# Patient Record
Sex: FEMALE | Race: BLACK OR AFRICAN AMERICAN | Employment: FULL TIME | URBAN - METROPOLITAN AREA
[De-identification: names, ages, dates, MRNs, and addresses within clinical notes are randomized per-mention and may not be internally consistent; named-entity substitution may affect disease eponyms.]

---

## 2017-03-08 ENCOUNTER — HOSPITAL ENCOUNTER (EMERGENCY)
Age: 27
Discharge: HOME | End: 2017-03-08
Attending: EMERGENCY MEDICINE
Payer: COMMERCIAL

## 2017-03-08 VITALS
RESPIRATION RATE: 18 BRPM | OXYGEN SATURATION: 100 % | HEIGHT: 63 IN | SYSTOLIC BLOOD PRESSURE: 105 MMHG | BODY MASS INDEX: 21.26 KG/M2 | DIASTOLIC BLOOD PRESSURE: 68 MMHG | HEART RATE: 63 BPM | TEMPERATURE: 97.8 F | WEIGHT: 120 LBS

## 2017-03-08 DIAGNOSIS — R51.9 LEFT FACIAL PAIN: Primary | ICD-10-CM

## 2017-03-08 DIAGNOSIS — K08.89 DENTALGIA: ICD-10-CM

## 2017-03-08 DIAGNOSIS — K02.9 DENTAL CARIES: ICD-10-CM

## 2017-03-08 PROCEDURE — 99282 EMERGENCY DEPT VISIT SF MDM: CPT

## 2017-03-08 RX ORDER — IBUPROFEN 600 MG/1
600 TABLET ORAL
Qty: 20 TAB | Refills: 0 | Status: SHIPPED | OUTPATIENT
Start: 2017-03-08 | End: 2017-04-23

## 2017-03-08 RX ORDER — HYDROCODONE BITARTRATE AND ACETAMINOPHEN 5; 325 MG/1; MG/1
1 TABLET ORAL
Qty: 12 TAB | Refills: 0 | Status: SHIPPED | OUTPATIENT
Start: 2017-03-08 | End: 2017-04-23

## 2017-03-08 RX ORDER — PENICILLIN V POTASSIUM 500 MG/1
500 TABLET, FILM COATED ORAL 4 TIMES DAILY
Qty: 28 TAB | Refills: 0 | Status: SHIPPED | OUTPATIENT
Start: 2017-03-08 | End: 2017-03-15

## 2017-03-08 RX ORDER — NORGESTIMATE AND ETHINYL ESTRADIOL 0.25-0.035
1 KIT ORAL DAILY
COMMUNITY
End: 2019-01-22 | Stop reason: ALTCHOICE

## 2017-03-08 NOTE — Clinical Note
Dr. Anders Malone, [address] 
[phone] [facility]: 
[address] [facility] [address], 101 [facility] 
[phone] Fillings, Cleanings, and Extractions 48[address] [facility] [address], [address] [place] 
[phone] Fillings, Cleanings, and Extractions [facility] [address] 
[phone] Fillings, Cleaning, and Extractions [facility] Department [address] , 3947 [facility] 
[phone] [address], [address] [place] [address] Road 
[phone] Ages 3-18, if attending [facility] 450 [facility] 
201 [facility], [address] [facility] [address] 
[phone] Extractions, Fillings, C true [facility], [address] [facility] [address] 
[phone] Oral Surgery - $70 required Cleanings, Fillings, Extractions - $100 Required [address],  [address] 
[phone] [employer] Only 6[address] [facility] [employer] and [address], [address] [facility] Clinic Open September to June

## 2017-03-09 NOTE — ED PROVIDER NOTES
[address]  EMERGENCY DEPARTMENT HISTORY AND PHYSICAL EXAM       Date: 3/8/2017   Patient Name: Jered Cabral   YOB: 1990  Medical Record Number: [id number]    History of Presenting Illness     Chief Complaint   Patient presents with    Dental Pain        History Provided By:  patient    Additional History:   11:23 PM  Jered Cabral is a 31 y.o. female who presents to the emergency department C/O dental pain to left upper jaw x 2 days. Pt reports she has been unable to sleep because of it. States she has no cracked teeth. No allergies to medications. Denies any other sx or complaints. Primary Care Provider: None   Specialist:    Past History     Past Medical History:   History reviewed. No pertinent past medical history. Past Surgical History:   History reviewed. No pertinent surgical history. Family History:   History reviewed. No pertinent family history. Social History:   Social History   Substance Use Topics    Smoking status: Never Smoker    Smokeless tobacco: None    Alcohol use None        Allergies:   No Known Allergies     Review of Systems   Review of Systems   Constitutional: Positive for activity change (lack of sleep). HENT: Positive for dental problem (right upper jaw). All other systems reviewed and are negative. Physical Exam  Vitals:    03/08/17 2317   BP: 105/68   Pulse: 63   Resp: 18   Temp: 97.8 °F (36.6 °C)   SpO2: 100%   Weight: 54.4 kg (120 lb)   Height: 5' 3\" (1.6 m)       Physical Exam   Constitutional: She is oriented to person, place, and time. She appears well-developed and well-nourished. No distress. Alert, appears uncomfortable, non toxic, speaking in full sentences without difficulty    HENT:   Head: Normocephalic and atraumatic. Right Ear: Tympanic membrane, external ear and ear canal normal. Tympanic membrane is not perforated, not erythematous, not retracted and not bulging.    Left Ear: Tympanic membrane, external ear and ear canal normal. Tympanic membrane is not perforated, not erythematous, not retracted and not bulging. Nose: Nose normal. No mucosal edema or rhinorrhea. Right sinus exhibits no maxillary sinus tenderness and no frontal sinus tenderness. Left sinus exhibits no maxillary sinus tenderness and no frontal sinus tenderness. Mouth/Throat: Uvula is midline, oropharynx is clear and moist and mucous membranes are normal. Mucous membranes are not dry. No uvula swelling. No oropharyngeal exudate, posterior oropharyngeal edema, posterior oropharyngeal erythema or tonsillar abscesses. Able to swallow secretions w/o difficulty, no trismus, no swelling or tenderness to the floor of the mouth    Neck: Normal range of motion. Neck supple. Cardiovascular: Normal rate, regular rhythm, normal heart sounds and intact distal pulses. No murmur heard. Pulmonary/Chest: Effort normal and breath sounds normal. No respiratory distress. She has no wheezes. She has no rales. Lymphadenopathy:     She has no cervical adenopathy. Neurological: She is alert and oriented to person, place, and time. Skin: Skin is warm and dry. No rash noted. Psychiatric: She has a normal mood and affect. Judgment normal.   Nursing note and vitals reviewed. Diagnostic Study Results     Labs -    No results found for this or any previous visit (from the past 12 hour(s)). Radiologic Studies -  No orders to display       Medical Decision Making   I am the first provider for this patient. I reviewed the vital signs, available nursing notes, past medical history, past surgical history, family history and social history. Vital Signs-Reviewed the patient's vital signs. No data found. Pulse Oximetry Analysis - Normal 100% on room air      Old Medical Records: Nursing notes. ED Course:    11:23 PM   Initial assessment performed.     Medications Given in the ED:  Medications - No data to display    Discharge Note:  11:31 PM  Pt has been reexamined. Patient has no new complaints, changes, or physical findings. Care plan outlined and precautions discussed. Results were reviewed with the patient. All medications were reviewed with the patient; will d/c home with norco and veetid. All of pt's questions and concerns were addressed. Patient was instructed and agrees to follow up with [facility] dental clinic, as well as to return to the ED upon further deterioration. Patient is ready to go home. Diagnosis   Clinical Impression:   1. Left facial pain    2. Dentalgia    3. Dental caries           Follow-up Information     Follow up With Details Comments Contact Info    [facility] Schedule an appointment as soon as possible for a visit in 2 days follow up with a dental clinic [address] [facility], [address] [facility] [address]    [facility] EMERGENCY DEPT  As needed, If symptoms worsen [address] [address]  [phone]          Discharge Medication List as of 3/8/2017 11:31 PM      START taking these medications    Details   penicillin v potassium (VEETID) 500 mg tablet Take 1 Tab by mouth four (4) times daily for 7 days. , Print, Disp-28 Tab, R-0      ibuprofen (MOTRIN) 600 mg tablet Take 1 Tab by mouth every six (6) hours as needed for Pain., Print, Disp-20 Tab, R-0      HYDROcodone-acetaminophen (NORCO) 5-325 mg per tablet Take 1 Tab by mouth every four (4) hours as needed for Pain. Max Daily Amount: 6 Tabs., Print, Disp-12 Tab, R-0         CONTINUE these medications which have NOT CHANGED    Details   norgestimate-ethinyl estradiol (ORTHO-CYCLEN, SPRINTEC) 0.25-35 mg-mcg tab Take 1 Tab by mouth daily. , Historical Med             _______________________________   Attestations: This note is prepared by Benito Dempsey, acting as a Scribe for Rufina Hernandez PA-C on 11:22 PM on 3/8/2017 .     Rufina Hernandez PA-C: The scribe's documentation has been prepared under my direction and personally reviewed by me in its entirety.   _______________________________

## 2017-03-09 NOTE — DISCHARGE INSTRUCTIONS
Head or Face Pain: Care Instructions  Your Care Instructions  Common causes of head or face pain are allergies, stress, and injuries. Other causes include tooth problems and sinus infections. Eating certain foods, such as chocolate or cheese, or drinking certain liquids, such as coffee or cola, can cause head pain for some people. If you have mild head pain, you may not need treatment. It is important to watch your symptoms and talk to your doctor if your pain continues or gets worse. Follow-up care is a key part of your treatment and safety. Be sure to make and go to all appointments, and call your doctor if you are having problems. It's also a good idea to know your test results and keep a list of the medicines you take. How can you care for yourself at home? · Take pain medicines exactly as directed. ¨ If the doctor gave you a prescription medicine for pain, take it as prescribed. ¨ If you are not taking a prescription pain medicine, ask your doctor if you can take an over-the-counter pain medicine. · Take it easy for the next few days or longer if you are not feeling well. · Use a warm, moist towel or heating pad set on low to relax tight muscles in your shoulder and neck. Have someone gently massage your neck and shoulders. · Put ice or a cold pack on the area for 10 to 20 minutes at a time. Put a thin cloth between the ice and your skin. When should you call for help? Call 911 anytime you think you may need emergency care. For example, call if:  · You have twitching, jerking, or a seizure. · You passed out (lost consciousness). · You have symptoms of a stroke. These may include:  ¨ Sudden numbness, tingling, weakness, or loss of movement in your face, arm, or leg, especially on only one side of your body. ¨ Sudden vision changes. ¨ Sudden trouble speaking. ¨ Sudden confusion or trouble understanding simple statements. ¨ Sudden problems with walking or balance.   ¨ A sudden, severe headache that is different from past headaches. · You have jaw pain and pain in your chest, shoulder, neck, or arm. Call your doctor now or seek immediate medical care if:  · You have a fever with a stiff neck or a severe headache. · You have nausea and vomiting, or you cannot keep food or liquids down. Watch closely for changes in your health, and be sure to contact your doctor if:  · Your head or face pain does not get better as expected. Where can you learn more? Go to http://litzy-radha.info/. Enter P568 in the search box to learn more about \"Head or Face Pain: Care Instructions. \"  Current as of: May 27, 2016  Content Version: 11.1  © 5982-7385 [employer]. Care instructions adapted under license by [employer] (which disclaims liability or warranty for this information). If you have questions about a medical condition or this instruction, always ask your healthcare professional. [place] [address] any warranty or liability for your use of this information. Tooth Decay: Care Instructions  Your Care Instructions    Tooth decay is damage to a tooth caused by plaque. Plaque is a thin film of bacteria that sticks to the teeth above and below the gum line. If plaque isn't removed from the teeth, it can build up and harden into tartar. The bacteria in plaque and tartar use sugars in food to make acids. These acids can cause tooth decay and gum disease. Any part of your tooth can decay, from the roots below the gum line to the chewing surface. Decay can affect the outer layer (enamel) or inner layer (dentin) of your teeth. The deeper the decay, the worse the damage. Untreated tooth decay will get worse and may lead to tooth loss. If you have a small hole (cavity) in your tooth, your dentist can repair it by removing the decay and filling the hole. If you have deeper decay, you may need more treatment.  A very badly damaged tooth may have to be removed. Follow-up care is a key part of your treatment and safety. Be sure to make and go to all appointments, and call your dentist if you are having problems. It's also a good idea to know your test results and keep a list of the medicines you take. How can you care for yourself at home? If you have pain:  · Take an over-the-counter pain medicine, such as acetaminophen (Tylenol), ibuprofen (Advil, Motrin), or naproxen (Aleve). Be safe with medicines. Read and follow all instructions on the label. ¨ Do not take two or more pain medicines at the same time unless the doctor told you to. Many pain medicines have acetaminophen, which is Tylenol. Too much acetaminophen (Tylenol) can be harmful. · Put ice or a cold pack on your cheek over the tooth for 10 to 15 minutes at a time. Put a thin cloth between the ice and your skin. To prevent tooth decay  · Brush teeth twice a day, and floss once a day. Brushing with fluoride toothpaste and flossing may be enough to reverse early decay. · Use a toothbrush with soft, rounded-end bristles and a head that is small enough to reach all parts of your teeth and mouth. Replace your toothbrush every 3 or 4 months. You may also use an electric toothbrush that has rotating and oscillating (back-and-forth) action. · Ask your dentist about having fluoride treatments at the dental office. · Brush your tongue to help get rid of bacteria. · Eat healthy foods that include whole grains, vegetables, and fruits. · Have your teeth cleaned by a professional at least two times a year. · Do not smoke or use smokeless tobacco. Tobacco can make tooth decay worse. When should you call for help? Call your dentist now or seek immediate medical care if:  · You have signs of infection, such as:  ¨ Increased pain, swelling, warmth, or redness. ¨ Red streaks on the gum leading from a tooth. ¨ Pus draining from the gum around a tooth. ¨ A fever. · You have a toothache.   Watch closely for changes in your health, and be sure to contact your dentist if you have any problems. Where can you learn more? Go to http://litzy-radha.info/. Enter N043 in the search box to learn more about \"Tooth Decay: Care Instructions. \"  Current as of: August 9, 2016  Content Version: 11.1  © 1400-7754 [employer]. Care instructions adapted under license by [employer] (which disclaims liability or warranty for this information). If you have questions about a medical condition or this instruction, always ask your healthcare professional. [address] any warranty or liability for your use of this information.

## 2017-04-23 ENCOUNTER — HOSPITAL ENCOUNTER (EMERGENCY)
Age: 27
Discharge: HOME | End: 2017-04-23
Attending: EMERGENCY MEDICINE | Admitting: EMERGENCY MEDICINE
Payer: COMMERCIAL

## 2017-04-23 VITALS
HEIGHT: 63 IN | RESPIRATION RATE: 20 BRPM | HEART RATE: 89 BPM | SYSTOLIC BLOOD PRESSURE: 114 MMHG | BODY MASS INDEX: 21.26 KG/M2 | DIASTOLIC BLOOD PRESSURE: 78 MMHG | TEMPERATURE: 98.7 F | OXYGEN SATURATION: 100 % | WEIGHT: 120 LBS

## 2017-04-23 DIAGNOSIS — G89.29 CHRONIC FEMALE PELVIC PAIN: ICD-10-CM

## 2017-04-23 DIAGNOSIS — R10.2 CHRONIC FEMALE PELVIC PAIN: ICD-10-CM

## 2017-04-23 DIAGNOSIS — N80.9 ENDOMETRIOSIS: Primary | ICD-10-CM

## 2017-04-23 PROCEDURE — 99282 EMERGENCY DEPT VISIT SF MDM: CPT

## 2017-04-23 RX ORDER — OXYCODONE AND ACETAMINOPHEN 5; 325 MG/1; MG/1
TABLET ORAL
COMMUNITY
End: 2018-08-18

## 2017-04-23 RX ORDER — TRAMADOL HYDROCHLORIDE 50 MG/1
50 TABLET ORAL
Qty: 20 TAB | Refills: 0 | Status: SHIPPED | OUTPATIENT
Start: 2017-04-23 | End: 2018-08-18

## 2017-04-23 NOTE — DISCHARGE INSTRUCTIONS
Endometriosis: Care Instructions  Your Care Instructions  Cells that are like the cells that line the inside your womb (uterus) sometimes grow on the outside of the uterus. This is called endometriosis. These clumps of cells can cause pain and problems with your periods. They can become inflamed and may bleed. Scar tissue that forms over time can make it difficult to get pregnant. Medicines and sometimes surgery can relieve pain and help women who want to get pregnant. Follow-up care is a key part of your treatment and safety. Be sure to make and go to all appointments, and call your doctor if you are having problems. Its also a good idea to know your test results and keep a list of the medicines you take. How can you care for yourself at home? · Take your medicines exactly as prescribed. Call your doctor if you think you are having a problem with your medicine. · Take pain medicines exactly as directed. ¨ If the doctor gave you a prescription medicine for pain, take it as prescribed. ¨ If you are not taking a prescription pain medicine, ask your doctor if you can take an over-the-counter medicine. · Apply heat, such as a hot water bottle or a heating pad set on low, to your lower belly. Or take a warm bath. Heat may relieve pain. · Lie down and put a pillow under your knees to raise your legs. This will relieve pressure on your back. When should you call for help? Call 911 anytime you think you may need emergency care. For example, call if:  · You passed out (lost consciousness). Call your doctor now or seek immediate medical care if:  · You have sudden, severe pain in your belly or pelvis. Watch closely for changes in your health, and be sure to contact your doctor if:  · You have new belly or pelvic pain. · You think you may be pregnant. · You do not get better as expected. Where can you learn more? Go to http://litzy-radha.info/.   Enter C119 in the search box to learn more about \"Endometriosis: Care Instructions. \"  Current as of: October 13, 2016  Content Version: 11.2  © 2532-8943 [employer]. Care instructions adapted under license by [employer] (which disclaims liability or warranty for this information). If you have questions about a medical condition or this instruction, always ask your healthcare professional. [address] any warranty or liability for your use of this information. Chronic Pelvic Pain: Care Instructions  Your Care Instructions    Pelvic pain in women is pain below the belly button. Chronic pelvic pain means you have had this pain for at least 6 months. The pain can range from a mild ache that comes and goes to a steady pain that makes it hard to sleep, work, or enjoy life. It can be hard to know what causes this pain. You may need a number of tests to find the cause. Some common causes include problems with your reproductive system and diseases of the urinary tract or bowel. Sometimes, chronic pelvic pain may be related to past or current physical or sexual abuse. But doctors can't always find the cause. This does not mean the pain is not real or that it is \"in your head. \" It is real pain, and you need to treat it. If your doctor finds the cause of the pain, you treat the cause. For example, if the cause is hormonal, you might need to take birth control pills. But if the tests don't show a cause, you can take steps to help with the pain. Follow-up care is a key part of your treatment and safety. Be sure to make and go to all appointments, and call your doctor if you are having problems. It's also a good idea to know your test results and keep a list of the medicines you take. How can you care for yourself at home? · Be safe with medicines. Read and follow all instructions on the label. ¨ If the doctor gave you a prescription medicine for pain, take it as prescribed.   ¨ If you are not taking a prescription pain medicine, ask your doctor if you can take an over-the-counter medicine. · If you have back pain, lie down and elevate your legs by placing a pillow under your knees. When lying on your side, bring your knees up to your chest.  · Put a warm water bottle, a heating pad set on low, or a warm cloth on your belly. Or take a warm bath. Do not go to sleep with a heating pad on your skin. · Relax. Try meditation, yoga exercises, or breathing. · Exercise regularly. It improves blood flow and reduces pain. · Keep a diary. Track your symptoms, menstrual cycle, sexual activity, and physical activity. Also track stressful events or illnesses. This information can help your doctor find the cause or treat it. When should you call for help? Call your doctor now or seek immediate medical care if:  · You have sudden, severe pain in your belly or pelvis. Watch closely for changes in your health, and be sure to contact your doctor if:  · Your pain gets worse. · You do not get better as expected. Where can you learn more? Go to http://litzy-radha.info/. Enter J977 in the search box to learn more about \"Chronic Pelvic Pain: Care Instructions. \"  Current as of: October 13, 2016  Content Version: 11.2  © 4247-6122 [employer]. Care instructions adapted under license by [employer] (which disclaims liability or warranty for this information). If you have questions about a medical condition or this instruction, always ask your healthcare professional. [place] [address] any warranty or liability for your use of this information.

## 2017-04-23 NOTE — ED PROVIDER NOTES
[address]  EMERGENCY DEPARTMENT HISTORY AND PHYSICAL EXAM       Date: 4/23/2017   Patient Name: Donal Kelly   YOB: 1990  Medical Record Number: [id number]    History of Presenting Illness     Chief Complaint   Patient presents with    Medication Refill    Letter for School/Work        History Provided By:  patient    Additional History:   4:59 PM  Donal Kelly is a 31 y.o. female with PMHx of endometriosis presenting to the ED C/O chronic abdominal pain which has worsened since pt ran out of her Percocet a few days ago. She states the pain is her typical endometriosis pain. Per her report, pain was so severe that she left work today and is requesting a work note so she can return. Pt's menstrual cycle began today and she reports typical worsening of pain when she's on it. She states that she usually takes Percocet but would prefer an Rx of non-narcotic to treat her pain because the Percocet makes her feel \"out of it. \" Pt is followed be Jim El MD (OB/Gyn) for her endometriosis and has an appointment in 4 days. Pt is allergic to PCN. Pt denies pain out of the ordinary and any other symptoms or complaints at this time. Primary Care Provider: None     Past History     Past Medical History:   Past Medical History:   Diagnosis Date    Endometriosis         Past Surgical History:   Past Surgical History:   Procedure Laterality Date    HX PELVIC LAPAROSCOPY      endometriosis        Family History:   History reviewed. No pertinent family history. Social History:   Social History   Substance Use Topics    Smoking status: Never Smoker    Smokeless tobacco: None    Alcohol use None        Allergies:   No Known Allergies     Review of Systems   Review of Systems   Gastrointestinal: Positive for abdominal pain. All other systems reviewed and are negative.       Physical Exam  Vitals:    04/23/17 1703   BP: 114/78   Pulse: 89   Resp: 20   Temp: 98.7 °F (37.1 °C)   SpO2: 100%   Weight: 54.4 kg (120 lb)   Height: 5' 3\" (1.6 m)       Physical Exam   Constitutional: She is oriented to person, place, and time. She appears well-developed and well-nourished. No distress. HENT:   Head: Normocephalic and atraumatic. Eyes: Conjunctivae and EOM are normal. Pupils are equal, round, and reactive to light. Neck: Normal range of motion. Neck supple. Cardiovascular: Normal rate and regular rhythm. Pulmonary/Chest: Effort normal and breath sounds normal.   Abdominal: Soft. Bowel sounds are normal. She exhibits no distension. There is no tenderness. There is no rebound and no guarding. Musculoskeletal: Normal range of motion. Neurological: She is alert and oriented to person, place, and time. Skin: Skin is warm and dry. Psychiatric: She has a normal mood and affect. Her behavior is normal.   Nursing note and vitals reviewed. Diagnostic Study Results     Labs -    No results found for this or any previous visit (from the past 12 hour(s)). Radiologic Studies -  The following have been ordered and reviewed:  No orders to display           Medical Decision Making   I am the first provider for this patient. I reviewed the vital signs, available nursing notes, past medical history, past surgical history, family history and social history. Vital Signs-Reviewed the patient's vital signs. Patient Vitals for the past 12 hrs:   Temp Pulse Resp BP SpO2   04/23/17 1703 98.7 °F (37.1 °C) 89 20 114/78 100 %       Procedures:   Procedures    ED Course:  4:59 PM  Initial assessment performed. The patients presenting problems have been discussed, and they are in agreement with the care plan formulated and outlined with them. I have encouraged them to ask questions as they arise throughout their visit. Medications Given in the ED:  Medications - No data to display    Discharge Note:  5:14 PM   Pt has been reexamined.  Patient has no new complaints, changes, or physical findings. Care plan outlined and precautions discussed. Results were reviewed with the patient. All medications were reviewed with the patient; will d/c home. All of pt's questions and concerns were addressed. Patient was instructed and agrees to follow up with PCP, as well as to return to the ED upon further deterioration. Patient is ready to go home. Diagnosis   Clinical Impression:   1. Endometriosis    2. Chronic female pelvic pain           Follow-up Information     Follow up With Details Comments Contact Info    [facility] CLINIC Call in 2 days  [address] [facility], [address] [facility] [address] [facility] [address]    [facility]  As needed, If symptoms worsen [address] [address]  [phone]          Current Discharge Medication List      START taking these medications    Details   traMADol (ULTRAM) 50 mg tablet Take 1 Tab by mouth every six (6) hours as needed for Pain. Max Daily Amount: 200 mg. Qty: 20 Tab, Refills: 0             _______________________________   Attestations: This note is prepared by Abdias James, acting as a Scribe for Johan Mejias PA-C on 4:58 PM on 4/23/2017 . Johan Mejias PA-C: The scribe's documentation has been prepared under my direction and personally reviewed by me in its entirety.   _______________________________

## 2017-04-23 NOTE — LETTER
[facility] [place] 
[facility] EMERGENCY DEPT 
[address] [address] [phone] Work/School Note Date: 4/23/2017 To Whom It May concern: 
 
Yojana Sims was seen and treated today in the emergency room by the following provider(s): 
Attending Provider: Marii Mayes MD 
Physician Assistant: HAZEL Corbin. Yojana Sims may return to work on 4/24/17. Please excuse her today, 4/23/17.  
 
Sincerely, 
 
 
 
 
Emma Munson PA-C

## 2017-04-23 NOTE — ED NOTES
Patient armband removed and shredded. I have reviewed discharge instructions with the patient. The patient verbalized understanding. Rx x1 given to pt, verbalized understanding.

## 2017-04-23 NOTE — ED TRIAGE NOTES
Presents today for chronic abd pain, states ran out of percocet and had to leave work due to pain. Period onset today, states typically has worse pain when period starts. Requesting medication refill until appointment on May 4th and a work note as pt had to leave work. Denies change in pain or situation, states \"It's my endometriosis\".

## 2017-11-15 ENCOUNTER — APPOINTMENT (OUTPATIENT)
Dept: GENERAL RADIOLOGY | Age: 27
End: 2017-11-15
Attending: NURSE PRACTITIONER
Payer: SELF-PAY

## 2017-11-15 ENCOUNTER — HOSPITAL ENCOUNTER (EMERGENCY)
Age: 27
Discharge: HOME | End: 2017-11-15
Attending: EMERGENCY MEDICINE
Payer: SELF-PAY

## 2017-11-15 VITALS
OXYGEN SATURATION: 100 % | SYSTOLIC BLOOD PRESSURE: 92 MMHG | HEART RATE: 93 BPM | DIASTOLIC BLOOD PRESSURE: 51 MMHG | RESPIRATION RATE: 18 BRPM | HEIGHT: 63 IN | TEMPERATURE: 97.6 F | WEIGHT: 127 LBS | BODY MASS INDEX: 22.5 KG/M2
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
## 2017-11-15 DIAGNOSIS — G44.309 POST-TRAUMATIC HEADACHE, NOT INTRACTABLE, UNSPECIFIED CHRONICITY PATTERN: ICD-10-CM

## 2017-11-15 DIAGNOSIS — S20.219A CONTUSION OF CHEST WALL, UNSPECIFIED LATERALITY, INITIAL ENCOUNTER: ICD-10-CM

## 2017-11-15 PROCEDURE — 74011250637 HC RX REV CODE- 250/637: Performed by: NURSE PRACTITIONER

## 2017-11-15 PROCEDURE — 71020 XR CHEST PA LAT: CPT

## 2017-11-15 PROCEDURE — 99283 EMERGENCY DEPT VISIT LOW MDM: CPT

## 2017-11-15 RX ORDER — IBUPROFEN 600 MG/1
600 TABLET ORAL
Status: COMPLETED | OUTPATIENT
Start: 2017-11-15 | End: 2017-11-15

## 2017-11-15 RX ORDER — METHOCARBAMOL 500 MG/1
750 TABLET, FILM COATED ORAL
Status: COMPLETED | OUTPATIENT
Start: 2017-11-15 | End: 2017-11-15

## 2017-11-15 RX ORDER — METHOCARBAMOL 750 MG/1
750 TABLET, FILM COATED ORAL 4 TIMES DAILY
Qty: 15 TAB | Refills: 0 | Status: SHIPPED | OUTPATIENT
Start: 2017-11-15 | End: 2018-08-18

## 2017-11-15 RX ORDER — IBUPROFEN 600 MG/1
600 TABLET ORAL
Qty: 20 TAB | Refills: 0 | Status: SHIPPED | OUTPATIENT
Start: 2017-11-15 | End: 2019-10-26

## 2017-11-15 RX ADMIN — IBUPROFEN 600 MG: 600 TABLET, FILM COATED ORAL at 21:50

## 2017-11-15 RX ADMIN — METHOCARBAMOL 750 MG: 500 TABLET ORAL at 21:50

## 2017-11-15 NOTE — LETTER
[facility] [place] 
THE [facility] EMERGENCY DEPT 
[address] [address] 
[phone] Work/School Note Date: 11/15/2017 To Whom It May concern: 
 
Aubrey Pinto was seen and treated today in the emergency room by the following provider(s): 
Attending Provider: Maicol Diop MD 
Nurse Practitioner: Yovani Grace NP. Aubrey Pinto may return to work on 11/17/17. Sincerely, Janay Cat NP
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
## 2018-08-18 ENCOUNTER — HOSPITAL ENCOUNTER (EMERGENCY)
Age: 28
Discharge: HOME | End: 2018-08-18
Attending: INTERNAL MEDICINE
Payer: COMMERCIAL

## 2018-08-18 VITALS
WEIGHT: 128 LBS | HEIGHT: 63 IN | DIASTOLIC BLOOD PRESSURE: 58 MMHG | BODY MASS INDEX: 22.68 KG/M2 | SYSTOLIC BLOOD PRESSURE: 95 MMHG | RESPIRATION RATE: 16 BRPM | OXYGEN SATURATION: 100 % | HEART RATE: 81 BPM | TEMPERATURE: 98.7 F

## 2018-08-18 DIAGNOSIS — K05.10 GINGIVITIS: ICD-10-CM

## 2018-08-18 DIAGNOSIS — K13.79 ORAL PAIN: Primary | ICD-10-CM

## 2018-08-18 PROCEDURE — 99282 EMERGENCY DEPT VISIT SF MDM: CPT

## 2018-08-18 RX ORDER — TRAMADOL HYDROCHLORIDE 50 MG/1
50 TABLET ORAL
Qty: 12 TAB | Refills: 0 | Status: SHIPPED | OUTPATIENT
Start: 2018-08-18 | End: 2019-10-26

## 2018-08-18 RX ORDER — PENICILLIN V POTASSIUM 500 MG/1
500 TABLET, FILM COATED ORAL 3 TIMES DAILY
Qty: 21 TAB | Refills: 0 | Status: SHIPPED | OUTPATIENT
Start: 2018-08-18 | End: 2018-08-25

## 2018-08-18 RX ORDER — CHLORHEXIDINE GLUCONATE 1.2 MG/ML
15 RINSE ORAL 2 TIMES DAILY
Qty: 420 ML | Refills: 0 | Status: SHIPPED | OUTPATIENT
Start: 2018-08-18 | End: 2018-09-01

## 2018-08-18 NOTE — DISCHARGE INSTRUCTIONS
Periodontal Conditions: Care Instructions  Your Care Instructions    Periodontal conditions affect the gums, bone, and tissue that surround and support the teeth. The most common problems are caused by plaque. Plaque is a thin film of bacteria that sticks to teeth above and below the gum line. It can build up and harden into tartar. The bacteria in plaque and tartar can cause gum disease. Gingivitis is a disease that affects the gums (gingiva). The gums are the soft tissue that surrounds the teeth. Gingivitis causes red, swollen, tender gums that bleed easily when brushed, persistent bad breath, and sensitive teeth. Because it is not painful, many people do not get treatment when they should. Gingivitis can be reversed with good dental care. Periodontitis is a more advanced disease that affects more than the gums. The gums pull away from the teeth. This leaves deep pockets where bacteria can grow. The disease can damage the bones that support the teeth. The teeth may get loose and fall out. A periodontal condition should be treated as soon as it is found. Finding gum problems early, treating them right away, and having regular checkups bring the best results. You can treat mild periodontal conditions by brushing and flossing your teeth every day. Your dentist may prescribe a mouthwash to kill the bacteria that can damage teeth and gums. Your dentist may have you take antibiotics to treat infection from moderate periodontal disease. If your gums have pulled away from your teeth, you may need cleaning between the teeth and gums right down to the teeth roots. This is called root planing and scaling. If you have severe periodontal disease, you may need surgery to remove diseased gum tissue or repair bone damage. Follow-up care is a key part of your treatment and safety. Be sure to make and go to all appointments, and call your dentist if you are having problems.  It's also a good idea to know your test results and keep a list of the medicines you take. How can you care for yourself at home? · If your dentist prescribed antibiotics, take them as directed. Do not stop taking them just because you feel better. You need to take the full course of antibiotics. · Brush your teeth twice a day, in the morning and at night. ¨ Use a toothbrush with soft, rounded-end bristles and a head that is small enough to reach all parts of your teeth and mouth. Replace your toothbrush every 3 to 4 months. ¨ Use a fluoride toothpaste. ¨ Place the brush at a 45-degree angle where the teeth meet the gums. Press firmly, and gently rock the brush back and forth using small circular movements. ¨ Brush chewing surfaces vigorously with short back-and-forth strokes. ¨ Brush your tongue from back to front. · Floss at least once a day. Choose the type and flavor that you like best.  · Have your teeth cleaned by a professional at least twice a year. · Ask your dentist about using an antibacterial mouthwash to help reduce bacteria. · Rinse your mouth with water or chew sugar-free gum after meals if you can't brush your teeth. · Do not smoke or use smokeless tobacco. Tobacco use can cause periodontal disease. When should you call for help? Call your dentist now or seek immediate medical care if:    · You have symptoms of infection, such as:  ¨ Increased pain, swelling, warmth, or redness. ¨ Red streaks leading from the area. ¨ Pus draining from the area. ¨ A fever.    Watch closely for changes in your health, and be sure to contact your dentist if:    · You have new or worse tooth pain.     · You do not get better as expected. Where can you learn more? Go to http://litzy-radha.info/. Enter L714 in the search box to learn more about \"Periodontal Conditions: Care Instructions. \"  Current as of: May 12, 2017  Content Version: 11.7  © 2452-1500 [employer], [employer].  Care instructions adapted under license by [employer] (which disclaims liability or warranty for this information). If you have questions about a medical condition or this instruction, always ask your healthcare professional. [address] any warranty or liability for your use of this information.

## 2018-08-18 NOTE — ED PROVIDER NOTES
EMERGENCY DEPARTMENT HISTORY AND PHYSICAL EXAM    Date: 8/18/2018  Patient Name: Eliecer Elise    History of Presenting Illness     Chief Complaint   Patient presents with    Jaw Pain         History Provided By: Patient    Chief Complaint: jaw pain  Duration: 1 Days  Timing:  Acute  Location: right sided, low  Modifying Factors: Salt water rinse  Associated Symptoms: denies any other associated signs or symptoms    Additional History (Context):   4:34 PM  Eliecer Elise is a 28 y.o. female who presents to the emergency department C/O right sided, low jaw pain onset 1 day ago. No associated symptoms. Used salt water rinses. Scheduled to see dental in 1 week. Pt denies fever and any other Sx or complaints. PCP: None    Current Outpatient Prescriptions   Medication Sig Dispense Refill    penicillin v potassium (VEETID) 500 mg tablet Take 1 Tab by mouth three (3) times daily for 7 days. 21 Tab 0    traMADol (ULTRAM) 50 mg tablet Take 1 Tab by mouth every six (6) hours as needed for Pain. Max Daily Amount: 200 mg. 12 Tab 0    chlorhexidine (PERIDEX) 0.12 % solution 15 mL by Swish and Spit route two (2) times a day for 14 days. 420 mL 0    ibuprofen (MOTRIN) 600 mg tablet Take 1 Tab by mouth every six (6) hours as needed for Pain. 20 Tab 0    norgestimate-ethinyl estradiol (ORTHO-CYCLEN, SPRINTEC) 0.25-35 mg-mcg tab Take 1 Tab by mouth daily. Past History     Past Medical History:  Past Medical History:   Diagnosis Date    Asthma     Endometriosis     Ill-defined condition     endometriosis       Past Surgical History:  Past Surgical History:   Procedure Laterality Date    ABDOMEN SURGERY PROC UNLISTED      exploratory lap    HX PELVIC LAPAROSCOPY      endometriosis       Family History:  History reviewed. No pertinent family history.     Social History:  Social History   Substance Use Topics    Smoking status: Former Smoker     Packs/day: 0.50    Smokeless tobacco: Current User    Alcohol use Yes      Comment: socially        Allergies:  No Known Allergies      Review of Systems   Review of Systems   Constitutional: Negative for fever. HENT:        (+) right sided, low jaw pain   All other systems reviewed and are negative. Physical Exam     Vitals:    18 1624   BP: 95/58   Pulse: 81   Resp: 16   Temp: 98.7 °F (37.1 °C)   SpO2: 100%   Weight: 58.1 kg (128 lb)   Height: 5' 3\" (1.6 m)     Physical Exam   Constitutional: She is oriented to person, place, and time. She appears well-developed and well-nourished. No distress. No facial swelling, able to open & close mouth easily    HENT:   Head: Normocephalic and atraumatic. Right Ear: Tympanic membrane and external ear normal.   Left Ear: Tympanic membrane and external ear normal.   Nose: Nose normal.   Mouth/Throat: Uvula is midline and oropharynx is clear and moist. No oral lesions. No trismus in the jaw. No uvula swelling or dental caries. No oropharyngeal exudate.  wisdom tooth right mot psterior mandibular molar without obvious decay or fracture; +right mid mandible with inflamed tender gingiva without obvious abscess ; buccal & oral mucosa otherwise normal   Eyes: Conjunctivae and EOM are normal. Pupils are equal, round, and reactive to light. Neck: Normal range of motion. Neck supple. Cardiovascular: Normal rate and regular rhythm. Pulmonary/Chest: Effort normal and breath sounds normal.   Musculoskeletal: Normal range of motion. Neurological: She is alert and oriented to person, place, and time. Skin: Skin is warm and dry. Psychiatric: She has a normal mood and affect. Her behavior is normal.   Nursing note and vitals reviewed. Diagnostic Study Results     Labs -   No results found for this or any previous visit (from the past 12 hour(s)).     Radiologic Studies -   No orders to display     CT Results  (Last 48 hours)    None        CXR Results  (Last 48 hours)    None          Medications given in the ED-  Medications - No data to display      Medical Decision Making   I am the first provider for this patient. I reviewed the vital signs, available nursing notes, past medical history, past surgical history, family history and social history. Vital Signs-Reviewed the patient's vital signs. Pulse Oximetry Analysis - 100% on RA     Records Reviewed: Nursing Notes and Old Medical Records    Procedures:  Procedures    ED Course:   4:34 PM Initial assessment performed. The patients presenting problems have been discussed, and they are in agreement with the care plan formulated and outlined with them. I have encouraged them to ask questions as they arise throughout their visit. Diagnosis and Disposition       DISCHARGE NOTE:  4:37 PM  Yunior Ro  results have been reviewed with her. She has been counseled regarding her diagnosis, treatment, and plan. She verbally conveys understanding and agreement of the signs, symptoms, diagnosis, treatment and prognosis and additionally agrees to follow up as discussed. She also agrees with the care-plan and conveys that all of her questions have been answered. I have also provided discharge instructions for her that include: educational information regarding their diagnosis and treatment, and list of reasons why they would want to return to the ED prior to their follow-up appointment, should her condition change. She has been provided with education for proper emergency department utilization. CLINICAL IMPRESSION:    1. Oral pain    2. Gingivitis        PLAN:  1. D/C Home  2. Current Discharge Medication List      START taking these medications    Details   penicillin v potassium (VEETID) 500 mg tablet Take 1 Tab by mouth three (3) times daily for 7 days. Qty: 21 Tab, Refills: 0      traMADol (ULTRAM) 50 mg tablet Take 1 Tab by mouth every six (6) hours as needed for Pain. Max Daily Amount: 200 mg.   Qty: 12 Tab, Refills: 0    Associated Diagnoses: Oral pain      chlorhexidine (PERIDEX) 0.12 % solution 15 mL by Swish and Spit route two (2) times a day for 14 days. Qty: 420 mL, Refills: 0           3. Follow-up Information     Follow up With Details Comments Contact Info    [facility] Schedule an appointment as soon as possible for a visit  For follow up with [facility] [address] [facility], [address] [facility] [address]    [facility] EMERGENCY DEPT Go to As needed, as symptoms worsen [address] [address]  [phone]        _______________________________    Attestations: This note is prepared by Krystyna Elizondo, acting as Scribe for Johan Mejias PA-C. Johan Mejias PA-C:  The scribe's documentation has been prepared under my direction and personally reviewed by me in its entirety.   I confirm that the note above accurately reflects all work, treatment, procedures, and medical decision making performed by me.  _______________________________

## 2019-03-11 ENCOUNTER — HOSPITAL ENCOUNTER (EMERGENCY)
Age: 29
Discharge: HOME | End: 2019-03-11
Attending: EMERGENCY MEDICINE
Payer: COMMERCIAL

## 2019-03-11 VITALS
BODY MASS INDEX: 23.74 KG/M2 | RESPIRATION RATE: 16 BRPM | HEART RATE: 81 BPM | OXYGEN SATURATION: 100 % | DIASTOLIC BLOOD PRESSURE: 70 MMHG | WEIGHT: 134 LBS | SYSTOLIC BLOOD PRESSURE: 122 MMHG | TEMPERATURE: 98.6 F | HEIGHT: 63 IN

## 2019-03-11 DIAGNOSIS — J06.9 UPPER RESPIRATORY TRACT INFECTION, UNSPECIFIED TYPE: ICD-10-CM

## 2019-03-11 DIAGNOSIS — R11.10 VOMITING, INTRACTABILITY OF VOMITING NOT SPECIFIED, PRESENCE OF NAUSEA NOT SPECIFIED, UNSPECIFIED VOMITING TYPE: Primary | ICD-10-CM

## 2019-03-11 LAB
FLUAV AG NPH QL IA: NEGATIVE
FLUBV AG NOSE QL IA: NEGATIVE

## 2019-03-11 PROCEDURE — 99282 EMERGENCY DEPT VISIT SF MDM: CPT

## 2019-03-11 PROCEDURE — 87804 INFLUENZA ASSAY W/OPTIC: CPT

## 2019-03-11 RX ORDER — ONDANSETRON 4 MG/1
4 TABLET, FILM COATED ORAL
Qty: 15 TAB | Refills: 0 | Status: SHIPPED | OUTPATIENT
Start: 2019-03-11 | End: 2019-10-26

## 2019-03-11 NOTE — LETTER
[facility] [place] 
THE [facility] EMERGENCY DEPT 
[address] [address] 
[phone] Work/School Note Date: 3/11/2019 To Whom It May concern: 
 
Woodson Leyden was seen and treated today in the emergency room by the following provider(s): 
Attending Provider: Gina Rosado MD 
Physician Assistant: HAZEL Escudero. Woodson Leyden may return to work on 3/13/19. Sincerely, HAZEL Gillespie

## 2019-03-12 NOTE — DISCHARGE INSTRUCTIONS
Nausea and Vomiting: Care Instructions  Your Care Instructions    When you are nauseated, you may feel weak and sweaty and notice a lot of saliva in your mouth. Nausea often leads to vomiting. Most of the time you do not need to worry about nausea and vomiting, but they can be signs of other illnesses. Two common causes of nausea and vomiting are stomach flu and food poisoning. Nausea and vomiting from viral stomach flu will usually start to improve within 24 hours. Nausea and vomiting from food poisoning may last from 12 to 48 hours. The doctor has checked you carefully, but problems can develop later. If you notice any problems or new symptoms, get medical treatment right away. Follow-up care is a key part of your treatment and safety. Be sure to make and go to all appointments, and call your doctor if you are having problems. It's also a good idea to know your test results and keep a list of the medicines you take. How can you care for yourself at home? · To prevent dehydration, drink plenty of fluids, enough so that your urine is light yellow or clear like water. Choose water and other caffeine-free clear liquids until you feel better. If you have kidney, heart, or liver disease and have to limit fluids, talk with your doctor before you increase the amount of fluids you drink. · Rest in bed until you feel better. · When you are able to eat, try clear soups, mild foods, and liquids until all symptoms are gone for 12 to 48 hours. Other good choices include dry toast, crackers, cooked cereal, and gelatin dessert, such as Jell-O. When should you call for help? Call 911 anytime you think you may need emergency care. For example, call if:    · You passed out (lost consciousness).    Call your doctor now or seek immediate medical care if:    · You have symptoms of dehydration, such as:  ? Dry eyes and a dry mouth. ? Passing only a little dark urine. ?  Feeling thirstier than usual.     · You have new or worsening belly pain.     · You have a new or higher fever.     · You vomit blood or what looks like coffee grounds.    Watch closely for changes in your health, and be sure to contact your doctor if:    · You have ongoing nausea and vomiting.     · Your vomiting is getting worse.     · Your vomiting lasts longer than 2 days.     · You are not getting better as expected. Where can you learn more? Go to http://litzy-radha.info/. Enter 25 [id number] in the search box to learn more about \"Nausea and Vomiting: Care Instructions. \"  Current as of: September 23, 2018  Content Version: 11.9  © 3837-4563 [employer], [employer]. Care instructions adapted under license by [employer] (which disclaims liability or warranty for this information). If you have questions about a medical condition or this instruction, always ask your healthcare professional. [address] any warranty or liability for your use of this information.

## 2019-03-12 NOTE — ED PROVIDER NOTES
EMERGENCY DEPARTMENT HISTORY AND PHYSICAL EXAM    Date: 3/11/2019  Patient Name: Araseli Hood    History of Presenting Illness     Chief Complaint   Patient presents with    Generalized Body Aches    Vomiting     History Provided By: Patient    Chief Complaint: Generalized myalgias  Duration: This morning  Timing:  Progressive  Location: Generalized  Quality: Aching  Severity: 5 out of 10  Associated Symptoms: chills, cough, congestion, nausea, vomiting, and sharp lower abdominal pain    Additional History (Context):   10:41 PM  Araseli Hood is a 33 y.o. female with PMHX endometriosis, asthma, and anemia who presents to the emergency department C/O progressive aching 5/10 generalized myalgias onset earlier this morning. Associated sxs include chills, cough, congestion, nausea, vomiting, and sharp lower abdominal pain. Patient reports recent sick contact at work and suspects that she may have the flu. She requests a work note and states that she will return to this ED if her symptoms worsen in lieu of further work up today. Pt denies any other sxs or complaints. PCP: HAZEL Oswald    Current Outpatient Medications   Medication Sig Dispense Refill    ondansetron hcl (ZOFRAN) 4 mg tablet Take 1 Tab by mouth every eight (8) hours as needed for Nausea. 15 Tab 0    L-Norgest&E Estradiol-E Estrad (SEASONIQUE) 0.15 mg-30 mcg (84)/10 mcg (7) 3MPk Take 1 Tab by mouth daily for 84 days. 1 Dose Pack 3    HYDROcodone-acetaminophen (XODOL)  mg tab per tablet Take  by mouth.  traMADol (ULTRAM) 50 mg tablet Take 1 Tab by mouth every six (6) hours as needed for Pain. Max Daily Amount: 200 mg. 12 Tab 0    ibuprofen (MOTRIN) 600 mg tablet Take 1 Tab by mouth every six (6) hours as needed for Pain.  20 Tab 0       Past History     Past Medical History:  Past Medical History:   Diagnosis Date    Abnormal Papanicolaou smear of cervix     Anemia     Asthma     Endometriosis     Ill-defined condition     endometriosis       Past Surgical History:  Past Surgical History:   Procedure Laterality Date    ABDOMEN SURGERY PROC UNLISTED      exploratory lap    HX PELVIC LAPAROSCOPY      endometriosis       Family History:  Family History   Problem Relation Age of Onset    Asthma Mother     Hypertension Father     Colon Cancer Maternal Grandmother     Diabetes Maternal Grandmother     Diabetes Maternal Grandfather        Social History:  Social History     Tobacco Use    Smoking status: Former Smoker     Packs/day: 0.50    Smokeless tobacco: Current User    Tobacco comment: VAPE   Substance Use Topics    Alcohol use: Yes     Comment: socially     Drug use: Yes     Frequency: 2.0 times per week     Types: Marijuana       Allergies: Allergies   Allergen Reactions    Other Plant, Animal, Environmental Other (comments)     Pollen       Review of Systems   Review of Systems   Constitutional: Negative for chills. HENT: Positive for congestion. Respiratory: Positive for cough. Gastrointestinal: Positive for abdominal pain (lower), nausea and vomiting. Musculoskeletal: Positive for myalgias (generalized). All other systems reviewed and are negative. Physical Exam     Vitals:    03/11/19 2024   BP: 122/70   Pulse: 81   Resp: 16   Temp: 98.6 °F (37 °C)   SpO2: 100%   Weight: 60.8 kg (134 lb)   Height: 5' 3\" (1.6 m)     Physical Exam   Constitutional: She is oriented to person, place, and time. She appears well-developed and well-nourished. No distress. Alert, non toxic, NAD    HENT:   Head: Normocephalic and atraumatic. Neck: Normal range of motion. Neck supple. Cardiovascular: Normal rate, regular rhythm, normal heart sounds and intact distal pulses. No murmur heard. Pulmonary/Chest: Effort normal and breath sounds normal. No respiratory distress. She has no wheezes. She has no rales. Abdominal: Soft. Bowel sounds are normal. There is no hepatosplenomegaly.  There is tenderness in the suprapubic area. There is no rigidity, no rebound, no guarding and no CVA tenderness. Neurological: She is alert and oriented to person, place, and time. Skin: Skin is warm and dry. Psychiatric: She has a normal mood and affect. Judgment normal.   Nursing note and vitals reviewed. Diagnostic Study Results     Labs:     Recent Results (from the past 12 hour(s))   INFLUENZA A & B AG (RAPID TEST)    Collection Time: 03/11/19  8:30 PM   Result Value Ref Range    Influenza A Antigen NEGATIVE  NEG      Influenza B Antigen NEGATIVE  NEG         Radiologic Studies:   No orders to display     CT Results  (Last 48 hours)    None        CXR Results  (Last 48 hours)    None          Medical Decision Making   I am the first provider for this patient. I reviewed the vital signs, available nursing notes, past medical history, past surgical history, family history and social history. Vital Signs: Reviewed the patient's vital signs. Pulse Oximetry Analysis: 100% on RA     Records Reviewed: Nursing Notes    Procedures:  Procedures    ED Course:   10:41 PM Initial assessment performed. The patients presenting problems have been discussed, and they are in agreement with the care plan formulated and outlined with them. I have encouraged them to ask questions as they arise throughout their visit. Discussion:  Pt presents with URI sxs and vomiting and lower abd pain. Pt negative for flu. Offered further workup (blood work, UA, etc) pt declined. sts she has been here long enough and just wants a work note. Explained that I cant r/u any serious or potentially life threatening conditions. Pt understands and agrees. Will give note and rx for Zofran. Strict return precautions given, pt offering no questions or complaints. Diagnosis and Disposition     DISCHARGE NOTE:  11:03 PM  Guzmanhumaira Morrow  results have been reviewed with her. She has been counseled regarding her diagnosis, treatment, and plan.   She verbally conveys understanding and agreement of the signs, symptoms, diagnosis, treatment and prognosis and additionally agrees to follow up as discussed. She also agrees with the care-plan and conveys that all of her questions have been answered. I have also provided discharge instructions for her that include: educational information regarding their diagnosis and treatment, and list of reasons why they would want to return to the ED prior to their follow-up appointment, should her condition change. She has been provided with education for proper emergency department utilization. CLINICAL IMPRESSION:    1. Vomiting, intractability of vomiting not specified, presence of nausea not specified, unspecified vomiting type    2. Upper respiratory tract infection, unspecified type        PLAN:  1. D/C Home  2. Discharge Medication List as of 3/11/2019 11:03 PM      START taking these medications    Details   ondansetron hcl (ZOFRAN) 4 mg tablet Take 1 Tab by mouth every eight (8) hours as needed for Nausea. , Print, Disp-15 Tab, R-0         CONTINUE these medications which have NOT CHANGED    Details   L-Norgest&E Estradiol-E Estrad (SEASONIQUE) 0.15 mg-30 mcg (84)/10 mcg (7) 3MPk Take 1 Tab by mouth daily for 84 days. , Normal, Disp-1 Dose Pack, R-3      HYDROcodone-acetaminophen (XODOL)  mg tab per tablet Take  by mouth., Historical Med      traMADol (ULTRAM) 50 mg tablet Take 1 Tab by mouth every six (6) hours as needed for Pain. Max Daily Amount: 200 mg., Print, Disp-12 Tab, R-0      ibuprofen (MOTRIN) 600 mg tablet Take 1 Tab by mouth every six (6) hours as needed for Pain., Print, Disp-20 Tab, R-0           3.    Follow-up Information     Follow up With Specialties Details Why Contact Info    Darylene Dally, [place] Physician Assistant  call for follow up and recheck   [address] [address] [facility] EMERGENCY DEPT Emergency Medicine  If symptoms worsen [address] [facility]  [phone]

## 2019-10-26 ENCOUNTER — HOSPITAL ENCOUNTER (EMERGENCY)
Age: 29
Discharge: HOME | End: 2019-10-26
Attending: EMERGENCY MEDICINE
Payer: COMMERCIAL

## 2019-10-26 ENCOUNTER — APPOINTMENT (OUTPATIENT)
Dept: GENERAL RADIOLOGY | Age: 29
End: 2019-10-26
Attending: PHYSICIAN ASSISTANT
Payer: COMMERCIAL

## 2019-10-26 VITALS
HEIGHT: 63 IN | OXYGEN SATURATION: 100 % | RESPIRATION RATE: 14 BRPM | WEIGHT: 150 LBS | BODY MASS INDEX: 26.58 KG/M2 | HEART RATE: 82 BPM | TEMPERATURE: 97.9 F | SYSTOLIC BLOOD PRESSURE: 108 MMHG | DIASTOLIC BLOOD PRESSURE: 69 MMHG

## 2019-10-26 DIAGNOSIS — S63.501A RIGHT WRIST SPRAIN, INITIAL ENCOUNTER: ICD-10-CM
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
## 2019-10-26 DIAGNOSIS — S93.401A SPRAIN OF RIGHT ANKLE, UNSPECIFIED LIGAMENT, INITIAL ENCOUNTER: ICD-10-CM

## 2019-10-26 DIAGNOSIS — S83.91XA SPRAIN OF RIGHT KNEE, UNSPECIFIED LIGAMENT, INITIAL ENCOUNTER: Primary | ICD-10-CM

## 2019-10-26 PROCEDURE — L3908 WHO COCK-UP NONMOLDE PRE OTS: HCPCS

## 2019-10-26 PROCEDURE — 73110 X-RAY EXAM OF WRIST: CPT

## 2019-10-26 PROCEDURE — 99283 EMERGENCY DEPT VISIT LOW MDM: CPT

## 2019-10-26 PROCEDURE — 73610 X-RAY EXAM OF ANKLE: CPT

## 2019-10-26 PROCEDURE — 74011250637 HC RX REV CODE- 250/637: Performed by: PHYSICIAN ASSISTANT

## 2019-10-26 PROCEDURE — 73564 X-RAY EXAM KNEE 4 OR MORE: CPT

## 2019-10-26 PROCEDURE — L1830 KO IMMOB CANVAS LONG PRE OTS: HCPCS

## 2019-10-26 RX ORDER — ONDANSETRON 4 MG/1
4 TABLET, ORALLY DISINTEGRATING ORAL
Status: COMPLETED | OUTPATIENT
Start: 2019-10-26 | End: 2019-10-26

## 2019-10-26 RX ORDER — IBUPROFEN 600 MG/1
600 TABLET ORAL
Qty: 20 TAB | Refills: 0 | Status: SHIPPED | OUTPATIENT
Start: 2019-10-26 | End: 2020-06-03

## 2019-10-26 RX ORDER — IBUPROFEN 600 MG/1
600 TABLET ORAL
Status: COMPLETED | OUTPATIENT
Start: 2019-10-26 | End: 2019-10-26

## 2019-10-26 RX ORDER — METHOCARBAMOL 500 MG/1
500 TABLET, FILM COATED ORAL 4 TIMES DAILY
Qty: 20 TAB | Refills: 0 | Status: SHIPPED | OUTPATIENT
Start: 2019-10-26 | End: 2019-10-31

## 2019-10-26 RX ADMIN — IBUPROFEN 600 MG: 600 TABLET, FILM COATED ORAL at 11:18

## 2019-10-26 RX ADMIN — ONDANSETRON 4 MG: 4 TABLET, ORALLY DISINTEGRATING ORAL at 11:18

## 2019-10-26 NOTE — LETTER
[facility] [place] 
[facility] 
[address] [address] 
[phone] Work/School Note Date: 10/26/2019 To Whom It May concern: 
 
Fabio Thomas was seen and treated today in the emergency room by the following provider(s): 
Attending Provider: Nighat Pastrana DO Physician Assistant: Leandro Gilliam PA-C. Fabio Thomas may return to work on 10/29/2019. Sincerely, Govind Francisco PA-C
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
## 2020-06-03 ENCOUNTER — APPOINTMENT (OUTPATIENT)
Dept: GENERAL RADIOLOGY | Age: 30
End: 2020-06-03
Attending: PHYSICIAN ASSISTANT
Payer: COMMERCIAL

## 2020-06-03 ENCOUNTER — HOSPITAL ENCOUNTER (EMERGENCY)
Age: 30
Discharge: HOME | End: 2020-06-03
Attending: EMERGENCY MEDICINE
Payer: COMMERCIAL

## 2020-06-03 VITALS
HEIGHT: 64 IN | WEIGHT: 138 LBS | RESPIRATION RATE: 18 BRPM | TEMPERATURE: 98.6 F | OXYGEN SATURATION: 100 % | SYSTOLIC BLOOD PRESSURE: 117 MMHG | BODY MASS INDEX: 23.56 KG/M2 | DIASTOLIC BLOOD PRESSURE: 78 MMHG | HEART RATE: 94 BPM

## 2020-06-03 DIAGNOSIS — R05.9 COUGH: Primary | ICD-10-CM

## 2020-06-03 DIAGNOSIS — Z20.822 ENCOUNTER FOR LABORATORY TESTING FOR COVID-19 VIRUS: ICD-10-CM

## 2020-06-03 LAB
ATRIAL RATE: 80 BPM
CALCULATED P AXIS, ECG09: 74 DEGREES
CALCULATED R AXIS, ECG10: 54 DEGREES
CALCULATED T AXIS, ECG11: 43 DEGREES
DIAGNOSIS, 93000: NORMAL
P-R INTERVAL, ECG05: 128 MS
Q-T INTERVAL, ECG07: 352 MS
QRS DURATION, ECG06: 80 MS
QTC CALCULATION (BEZET), ECG08: 405 MS
VENTRICULAR RATE, ECG03: 80 BPM

## 2020-06-03 PROCEDURE — 99284 EMERGENCY DEPT VISIT MOD MDM: CPT

## 2020-06-03 PROCEDURE — 93005 ELECTROCARDIOGRAM TRACING: CPT

## 2020-06-03 PROCEDURE — 71045 X-RAY EXAM CHEST 1 VIEW: CPT

## 2020-06-03 PROCEDURE — 87635 SARS-COV-2 COVID-19 AMP PRB: CPT

## 2020-06-03 RX ORDER — BENZONATATE 100 MG/1
100 CAPSULE ORAL
Qty: 30 CAP | Refills: 0 | Status: SHIPPED | OUTPATIENT
Start: 2020-06-03 | End: 2020-06-10

## 2020-06-03 RX ORDER — ALBUTEROL SULFATE 90 UG/1
2 AEROSOL, METERED RESPIRATORY (INHALATION)
Qty: 1 INHALER | Refills: 0 | Status: SHIPPED | OUTPATIENT
Start: 2020-06-03

## 2020-06-03 NOTE — ED PROVIDER NOTES
EMERGENCY DEPARTMENT HISTORY AND PHYSICAL EXAM    Date: 6/3/2020  Patient Name: Joelle Hart    History of Presenting Illness     Chief Complaint   Patient presents with    Shortness of Breath         History Provided By: Patient    Chief Complaint: cough      Additional History (Context):   6:23 PM  Joelle Hart is a 26 y.o. female presents to the emergency department C/O cough shortness of breath and some pain with coughing that began yesterday. Patient states she works at [employer] [address] [employer] [address] in [place] and has been around other coworkers that have been diagnosed with Seamus and was instructed to come for testing. She denies any fevers. She denies any URI symptoms. She denies any leg swelling or pain. No long distance travel recently, no recent surgeries, no recent immobilizations, no history of blood clots or cancer, no birth control use. Patient does smoke cigarettes. PCP: HAZEL Gaona    Current Outpatient Medications   Medication Sig Dispense Refill    benzonatate (Tessalon Perles) 100 mg capsule Take 1 Cap by mouth three (3) times daily as needed for Cough for up to 7 days. 30 Cap 0    albuterol (PROVENTIL HFA, VENTOLIN HFA, PROAIR HFA) 90 mcg/actuation inhaler Take 2 Puffs by inhalation every four (4) hours as needed for Wheezing.  1 Inhaler 0       Past History     Past Medical History:  Past Medical History:   Diagnosis Date    Abnormal Papanicolaou smear of cervix     Anemia     Asthma     Endometriosis     Ill-defined condition     endometriosis       Past Surgical History:  Past Surgical History:   Procedure Laterality Date    ABDOMEN SURGERY PROC UNLISTED      exploratory lap    HX PELVIC LAPAROSCOPY      endometriosis       Family History:  Family History   Problem Relation Age of Onset    Asthma Mother     Hypertension Father     Colon Cancer Maternal Grandmother     Diabetes Maternal Grandmother     Diabetes Maternal Grandfather        Social History:  Social History     Tobacco Use    Smoking status: Current Every Day Smoker     Packs/day: 0.50    Smokeless tobacco: Current User    Tobacco comment: VAPE   Substance Use Topics    Alcohol use: Yes     Comment: socially     Drug use: Not Currently     Frequency: 2.0 times per week     Types: Marijuana       Allergies: Allergies   Allergen Reactions    Other Plant, Animal, Environmental Other (comments)     Pollen       Review of Systems   Review of Systems   Constitutional: Negative for chills and fever. HENT: Negative for congestion, rhinorrhea, sneezing, sore throat and trouble swallowing. Respiratory: Positive for cough and shortness of breath. Negative for wheezing. Cardiovascular: Negative for palpitations and leg swelling. Neurological: Negative for weakness and numbness. All other systems reviewed and are negative. Physical Exam     Vitals:    06/03/20 1830 06/03/20 1856 06/03/20 1915 06/03/20 2015   BP: 124/78  107/70 117/78   Pulse: 94      Resp: 18      Temp: 98.6 °F (37 °C)      SpO2: 100% 100%     Weight: 62.6 kg (138 lb)      Height: 5' 4\" (1.626 m)        Physical Exam  Vitals signs and nursing note reviewed. Constitutional:       General: She is not in acute distress. Appearance: She is well-developed. Comments: Alert, well appearing, non toxic, speaking in full sentences without difficulty    HENT:      Head: Normocephalic and atraumatic. Right Ear: Tympanic membrane, ear canal and external ear normal. Tympanic membrane is not perforated, erythematous, retracted or bulging. Left Ear: Tympanic membrane, ear canal and external ear normal. Tympanic membrane is not perforated, erythematous, retracted or bulging. Nose: Nose normal. No mucosal edema or rhinorrhea. Right Sinus: No maxillary sinus tenderness or frontal sinus tenderness. Left Sinus: No maxillary sinus tenderness or frontal sinus tenderness.       Mouth/Throat:      Mouth: Mucous membranes are not dry. Pharynx: Uvula midline. No oropharyngeal exudate, posterior oropharyngeal erythema or uvula swelling. Tonsils: No tonsillar abscesses. Neck:      Musculoskeletal: Normal range of motion and neck supple. Cardiovascular:      Rate and Rhythm: Normal rate and regular rhythm. Heart sounds: Normal heart sounds. No murmur. Pulmonary:      Effort: Pulmonary effort is normal. No respiratory distress. Breath sounds: Normal breath sounds. No wheezing or rales. Comments: Clear to auscultation bilaterally  Abdominal:      General: Bowel sounds are normal.      Palpations: Abdomen is soft. Tenderness: There is no abdominal tenderness. Lymphadenopathy:      Cervical: No cervical adenopathy. Skin:     General: Skin is warm and dry. Findings: No rash. Neurological:      Mental Status: She is alert and oriented to person, place, and time. Psychiatric:         Judgment: Judgment normal.         Diagnostic Study Results     Labs:     Recent Results (from the past 12 hour(s))   EKG, 12 LEAD, INITIAL    Collection Time: 06/03/20  6:41 PM   Result Value Ref Range    Ventricular Rate 80 BPM    Atrial Rate 80 BPM    P-R Interval 128 ms    QRS Duration 80 ms    Q-T Interval 352 ms    QTC Calculation (Bezet) 405 ms    Calculated P Axis 74 degrees    Calculated R Axis 54 degrees    Calculated T Axis 43 degrees    Diagnosis       Normal sinus rhythm  Normal ECG  No previous ECGs available     SARS-COV-2    Collection Time: 06/03/20  6:45 PM   Result Value Ref Range    SARS-CoV-2 PENDING        Radiologic Studies:   XR CHEST PORT   Final Result   Impression:      No acute findings. CT Results  (Last 48 hours)    None        CXR Results  (Last 48 hours)               06/03/20 1919  XR CHEST PORT Final result    Impression:  Impression:       No acute findings. Narrative:  CLINICAL: Cough. COMPARISON: 11/15/2017.        A portable view of the chest and upright: The lungs and pleural spaces are clear. The mediastinum is unremarkable in   appearance. Medical Decision Making   I am the first provider for this patient. I reviewed the vital signs, available nursing notes, past medical history, past surgical history, family history and social history. Vital Signs: Reviewed the patient's vital signs. Pulse Oximetry Analysis: 100% on RA     EKG interpretation: (Preliminary)  6:23 PM   Normal sinus rhythm rate 80 bpm no STEMI  EKG read by Hermelindo Britton PA-C at [facility] PM     Records Reviewed: Nursing Notes and Old Medical Records    Procedures:  Procedures    ED Course:   6:23 PM Initial assessment performed. The patients presenting problems have been discussed, and they are in agreement with the care plan formulated and outlined with them. I have encouraged them to ask questions as they arise throughout their visit. Discussion:  Pt presents with cough and shortness of breath since yesterday. Vital signs normal.  X-ray is normal.  EKG normal.  She is PERC negative. Chest pain is only present with coughing and deep breathing low suspicion for cardiac etiology. COVID testing pending. Patient stable for discharge with self-isolation instructions. Strict return precautions given, pt offering no questions or complaints. Diagnosis and Disposition     DISCHARGE NOTE:  Issac Casillas  results have been reviewed with her. She has been counseled regarding her diagnosis, treatment, and plan. She verbally conveys understanding and agreement of the signs, symptoms, diagnosis, treatment and prognosis and additionally agrees to follow up as discussed. She also agrees with the care-plan and conveys that all of her questions have been answered.   I have also provided discharge instructions for her that include: educational information regarding their diagnosis and treatment, and list of reasons why they would want to return to the ED prior to their follow-up appointment, should her condition change. She has been provided with education for proper emergency department utilization. CLINICAL IMPRESSION:    1. Cough    2. Encounter for laboratory testing for COVID-19 virus        PLAN:  1. D/C Home  2. Discharge Medication List as of 6/3/2020  8:13 PM      START taking these medications    Details   benzonatate (Tessalon Perles) 100 mg capsule Take 1 Cap by mouth three (3) times daily as needed for Cough for up to 7 days. , Print, Disp-30 Cap, R-0      albuterol (PROVENTIL HFA, VENTOLIN HFA, PROAIR HFA) 90 mcg/actuation inhaler Take 2 Puffs by inhalation every four (4) hours as needed for Wheezing., Print, Disp-1 Inhaler, R-0           3. Follow-up Information     Follow up With Specialties Details Why Contact Info    Bobby Ramirez [place] Physician Assistant  call for follow up and recheck  [address] EMERGENCY DEPT Emergency Medicine  If symptoms worsen [address] [place] [address] [phone]                 Please note that this dictation was completed with [employer]ate[employer], the [employer]anto voice recognition software. Quite often unanticipated grammatical, syntax, homophones, and other interpretive errors are inadvertently transcribed by the computer software. Please disregard these errors. Please excuse any errors that have escaped final proofreading.

## 2020-06-03 NOTE — ED TRIAGE NOTES
Patient ambulatory into ER c/o of SOB, cough, runny nose, fatigue that started yesterday. Patient denies taking fever reducing medication. 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 Patient able to make need known.  A&Ox4

## 2020-06-03 NOTE — LETTER
[facility] [place] 
[facility] 
[address] [address] [phone] Work/School Note Date: 6/3/2020 To Whom It May concern: 
 
Armida Boeck was seen and treated today in the emergency room by the following provider(s): 
Attending Provider: Eve Calle DO Physician Assistant: HAZEL Sylvester. Armida Boeck may return to work once COVID test has resulted. Sincerely, HAZEL Boone

## 2020-06-04 ENCOUNTER — TELEPHONE (OUTPATIENT)
Dept: CASE MANAGEMENT | Age: 30
End: 2020-06-04

## 2020-06-04 NOTE — DISCHARGE INSTRUCTIONS
Coronavirus (HEJVC-36): Care Instructions  Overview  The coronavirus disease (COVID-19) is caused by a virus. Symptoms may include a fever, a cough, and shortness of breath. It mainly spreads person-to-person through droplets from coughing and sneezing. The virus also can spread when people are in close contact with someone who is infected. Most people have mild symptoms and can take care of themselves at home. If their symptoms get worse, they may need care in a hospital. There is no medicine to fight the virus. It's important to not spread the virus to others. If you have COVID-19, wear a face cover anytime you are around other people. You need to isolate yourself while you are sick. Your doctor or local public health official will tell you when you no longer need to be isolated. Leave your home only if you need to get medical care. Follow-up care is a key part of your treatment and safety. Be sure to make and go to all appointments, and call your doctor if you are having problems. It's also a good idea to know your test results and keep a list of the medicines you take. How can you care for yourself at home? · Get extra rest. It can help you feel better. · Drink plenty of fluids. This helps replace fluids lost from fever. Fluids also help ease a scratchy throat. Water, soup, fruit juice, and hot tea with lemon are good choices. · Take acetaminophen (such as Tylenol) to reduce a fever. It may also help with muscle aches. Read and follow all instructions on the label. · Sponge your body with lukewarm water to help with fever. Don't use cold water or ice. · Use petroleum jelly on sore skin. This can help if the skin around your nose and lips becomes sore from rubbing a lot with tissues. Tips for isolation  · Wear a cloth face cover when you are around other people. It can help stop the spread of the virus when you cough or sneeze. · Limit contact with people in your home.  If possible, stay in a separate bedroom and use a separate bathroom. · If you have to leave home, avoid crowds and try to stay at least 6 feet away from other people. · Avoid contact with pets and other animals. · Cover your mouth and nose with a tissue when you cough or sneeze. Then throw it in the trash right away. · Wash your hands often, especially after you cough or sneeze. Use soap and water, and scrub for at least 20 seconds. If soap and water aren't available, use an alcohol-based hand . · Don't share personal household items. These include bedding, towels, cups and glasses, and eating utensils. · [address] in the warmest water allowed for the fabric type, and dry it completely. It's okay to wash other people's laundry with yours. · Clean and disinfect your home every day. Use household  and disinfectant wipes or sprays. Take special care to clean things that you grab with your hands. These include doorknobs, remote controls, phones, and handles on your refrigerator and microwave. And don't forget countertops, tabletops, bathrooms, and computer keyboards. When should you call for help? [id number] anytime you think you may need emergency care. For example, call if you have life-threatening symptoms, such as:  · You have severe trouble breathing. (You can't talk at all.)  · You have constant chest pain or pressure. · You are severely dizzy or lightheaded. · You are confused or can't think clearly. · Your face and lips have a blue color. · You pass out (lose consciousness) or are very hard to wake up. Call your doctor now or seek immediate medical care if:  · You have moderate trouble breathing. (You can't speak a full sentence.)  · You are coughing up blood (more than about 1 teaspoon). · You have signs of low blood pressure. These include feeling lightheaded; being too weak to stand; and having cold, pale, clammy skin.   Watch closely for changes in your health, and be sure to contact your doctor if:  · Your symptoms get worse. · You are not getting better as expected. Call before you go to the doctor's office. Follow their instructions. And wear a cloth face cover. Current as of: May 8, 2020               Content Version: 12.5  © 9952-4025 [employer]wise, [employer]. Care instructions adapted under license by We (which disclaims liability or warranty for this information). If you have questions about a medical condition or this instruction, always ask your healthcare professional. [place] [address] any warranty or liability for your use of this information.

## 2020-06-04 NOTE — TELEPHONE ENCOUNTER
Patient contacted regarding recent discharge and COVID-19 risk   Care Coordinator contacted the patient by telephone to perform post discharge assessment. Verified name and  with patient as identifiers. Patient has following risk factors of: no known risk factors and exposure to co-workers that  have tested positive. Care Coordinator reviewed discharge instructions, medical action plan and red flags related to discharge diagnosis. Reviewed and educated them on any new and changed medications related to discharge diagnosis. Advised obtaining a 90-day supply of all daily and as-needed medications. Education provided regarding infection prevention, and signs and symptoms of COVID-19 and when to seek medical attention with patient who verbalized understanding. Discussed exposure protocols and quarantine from [address] you at higher risk for severe illness  and given an opportunity for questions and concerns. The patient agrees to contact the COVID-19 hotline [phone] or PCP office for questions related to their healthcare. Care Coordinator provided contact information for future reference. From CDC: Are you at higher risk for severe illness?  Wash your hands often.  Avoid close contact (6 feet, which is about two arm lengths) with people who are sick.  Put distance between yourself and other people if COVID-19 is spreading in your community.  Clean and disinfect frequently touched surfaces.  Avoid all cruise travel and non-essential air travel.  Call your healthcare professional if you have concerns about COVID-19 and your underlying condition or if you are sick. For more information on steps you can take to protect yourself, see CDC's How to Protect Yourself      Patient/family/caregiver given information for [facility] Lacy and agrees to enroll yes  Patient's preferred e-mail:  [email or web address]  Patient's preferred phone number: [phone]  Based on Loop alert triggers, patient will be contacted by nurse care manager for worsening symptoms. Pt will be further monitored by COVID Loop Team based on severity of symptoms and risk factors.

## 2020-06-06 LAB — SARS-COV-2, COV2NT: NOT DETECTED

## 2024-08-21 NOTE — H&P
[facility]  [address]  [facility] [address]  Tel:  [phone]  Fax: [phone]    Patient: Georgette Monroe    YOB: 1990   Birth Sex: Female   Date: 08/20/2024 08:59 AM   Visit Type: Office Visit - GYN     Assessment/Plan  # Detail Type Description    1. Assessment Secondary dysmenorrhea (N94.5).    Patient Plan Scheduled for  RA TLH/Bilateral salpingectomy.  The procedure was discussed with the patient in detail.  She understands that the risks include but are not limited to: Heavy bleeding, need for blood transfusion, infection, injury to internal or adjacent organs, risks associated with anesthesia, wound infection or separation, DVT, pulmonary embolus, pneumonia and death associated with surgery.  All questions were answered and the patient wants to proceed.         2. Assessment Deep endometriosis of the left pelvic sidewall (N80.342).              This 33 year old patient presents for Dysmenorrhea:.    History of Present Illness  1.  Dysmenorrhea:   The symptoms began 10 years ago and generally lasts varies. The symptoms are reported as being severe. The symptoms occur randomly. The location is uterine. The symptoms are described as cramping. Aggravating factors include menses. Relieving factors include time. The patient states the symptoms are acute and are unchanged. Patient presents for a pre operative visit. Patient is scheduled for a RA TLH/Bilateral salpingectomy due to having ongoing dysmenorrhea.            Gynecologic History  08/20/2024 08:59 AM  Date of last Pap: 08/01/2024.    Obstetric History  Not currently pregnant.   Medical History  (Detailed)  Disease Onset Date Comments   Pelvic Pain, Secondary Dysmenorrhea, Menorrhagia  NICHOLAS 06/28/2024 -   2 laparoscopies         Surgical History/Management  (Detailed)  Management Date Comments   Diagnostic laparoscopy, D&C/Hysteroscopy 06/28/2024 NICHOLAS 06/28/2024 -     Diagnostics History  Status Study  Ordered Completed Interpretation  Result / Report   ordered TRANSVAGINAL US, NON-OB 2024           Problem List  Problem List reviewed.       Medications (active prior to today)  Medication Name Sig Description Start Date Stop Date Refilled Rx Elsewhere   escitalopram 10 mg tablet  2024   Y   fluticasone propionate 50 mcg/actuation nasal spray,suspension USE 1 SPRAY(S) IN EACH NOSTRIL ONCE DAILY 2024   Y   ibuprofen 600 mg tablet TAKE 1 TABLET BY MOUTH EVERY 6 HOURS AS NEEDED 2024   Y   ibuprofen 600 mg tablet take 1 tablet by oral route 3 times every day with food 2024   N   Valium 10 mg tablet take 1 tablet by oral route 30 min prior to appt 2024   N       Medication Reconciliation  Medications reconciled today.      Allergies  Ingredient Reaction (Severity) Comment   NO KNOWN ALLERGIES           Family History    (Detailed)  Relationship Family Member Name  Age at Death Condition Onset Age Cause of Death       Family history of Diabetes mellitus  N       Family history of Cancer, breast  N       Family history of endometriosis  N       Family history of Hypertension  N     Social History  (Detailed)  Preferred language is English.      Education/Employment/Occupation  Employment History Status Retired [employer]  full-time     Marital Status/Family/Social Support  Marital status: Single     Tobacco use status: Cigarette smoker.    Smoking status: Current every day smoker.        Vital Signs   Height  Time ft in cm Last Measured Height Position    9:01 AM 5.0 3.00 160.02 05/10/2024      Weight/BSA/BMI  Time lb oz kg Context BMI kg/m2 BSA m2    9:01 .00  65.771  25.69 1.71     Measured by  Time Measured by    9:01 AM Rosa Fine         Physical Exam  Exam Findings Details   Constitutional Normal Well developed.   Psychiatric Normal Orientation - Oriented to time, place, person & situation. Appropriate mood and affect.         Medications

## 2024-09-04 ENCOUNTER — ANESTHESIA EVENT (OUTPATIENT)
Facility: HOSPITAL | Age: 34
End: 2024-09-04
Payer: COMMERCIAL

## 2024-09-05 ENCOUNTER — ANESTHESIA (OUTPATIENT)
Facility: HOSPITAL | Age: 34
End: 2024-09-05
Payer: COMMERCIAL

## 2024-09-05 ENCOUNTER — HOSPITAL ENCOUNTER (OUTPATIENT)
Facility: HOSPITAL | Age: 34
Setting detail: OUTPATIENT SURGERY
Discharge: HOME | End: 2024-09-05
Attending: OBSTETRICS & GYNECOLOGY | Admitting: OBSTETRICS & GYNECOLOGY
Payer: COMMERCIAL

## 2024-09-05 VITALS
OXYGEN SATURATION: 100 % | BODY MASS INDEX: 23.9 KG/M2 | HEART RATE: 73 BPM | TEMPERATURE: 97.5 F | DIASTOLIC BLOOD PRESSURE: 65 MMHG | SYSTOLIC BLOOD PRESSURE: 113 MMHG | WEIGHT: 140 LBS | HEIGHT: 64 IN | RESPIRATION RATE: 16 BRPM

## 2024-09-05 DIAGNOSIS — Z98.890 STATUS POST SURGERY: Primary | ICD-10-CM

## 2024-09-05 LAB
ABO + RH BLD: NORMAL
BLOOD GROUP ANTIBODIES SERPL: NORMAL
HCG UR QL: NEGATIVE
SPECIMEN EXP DATE BLD: NORMAL

## 2024-09-05 PROCEDURE — 2580000003 HC RX 258: Performed by: STUDENT IN AN ORGANIZED HEALTH CARE EDUCATION/TRAINING PROGRAM

## 2024-09-05 PROCEDURE — 7100000000 HC PACU RECOVERY - FIRST 15 MIN: Performed by: OBSTETRICS & GYNECOLOGY

## 2024-09-05 PROCEDURE — 7100000011 HC PHASE II RECOVERY - ADDTL 15 MIN: Performed by: OBSTETRICS & GYNECOLOGY

## 2024-09-05 PROCEDURE — 6360000002 HC RX W HCPCS: Performed by: OBSTETRICS & GYNECOLOGY

## 2024-09-05 PROCEDURE — 86850 RBC ANTIBODY SCREEN: CPT

## 2024-09-05 PROCEDURE — 86900 BLOOD TYPING SEROLOGIC ABO: CPT

## 2024-09-05 PROCEDURE — 2500000003 HC RX 250 WO HCPCS

## 2024-09-05 PROCEDURE — S2900 ROBOTIC SURGICAL SYSTEM: HCPCS | Performed by: OBSTETRICS & GYNECOLOGY

## 2024-09-05 PROCEDURE — 6360000002 HC RX W HCPCS

## 2024-09-05 PROCEDURE — 7100000001 HC PACU RECOVERY - ADDTL 15 MIN: Performed by: OBSTETRICS & GYNECOLOGY

## 2024-09-05 PROCEDURE — 3700000000 HC ANESTHESIA ATTENDED CARE: Performed by: OBSTETRICS & GYNECOLOGY

## 2024-09-05 PROCEDURE — 88307 TISSUE EXAM BY PATHOLOGIST: CPT

## 2024-09-05 PROCEDURE — 3600000009 HC SURGERY ROBOT BASE: Performed by: OBSTETRICS & GYNECOLOGY

## 2024-09-05 PROCEDURE — 2580000003 HC RX 258: Performed by: OBSTETRICS & GYNECOLOGY

## 2024-09-05 PROCEDURE — 7100000010 HC PHASE II RECOVERY - FIRST 15 MIN: Performed by: OBSTETRICS & GYNECOLOGY

## 2024-09-05 PROCEDURE — 6360000002 HC RX W HCPCS: Performed by: STUDENT IN AN ORGANIZED HEALTH CARE EDUCATION/TRAINING PROGRAM

## 2024-09-05 PROCEDURE — 86901 BLOOD TYPING SEROLOGIC RH(D): CPT

## 2024-09-05 PROCEDURE — 6370000000 HC RX 637 (ALT 250 FOR IP): Performed by: STUDENT IN AN ORGANIZED HEALTH CARE EDUCATION/TRAINING PROGRAM

## 2024-09-05 PROCEDURE — 2709999900 HC NON-CHARGEABLE SUPPLY: Performed by: OBSTETRICS & GYNECOLOGY

## 2024-09-05 PROCEDURE — 6370000000 HC RX 637 (ALT 250 FOR IP): Performed by: OBSTETRICS & GYNECOLOGY

## 2024-09-05 PROCEDURE — 3600000019 HC SURGERY ROBOT ADDTL 15MIN: Performed by: OBSTETRICS & GYNECOLOGY

## 2024-09-05 PROCEDURE — 81025 URINE PREGNANCY TEST: CPT

## 2024-09-05 PROCEDURE — 3700000001 HC ADD 15 MINUTES (ANESTHESIA): Performed by: OBSTETRICS & GYNECOLOGY

## 2024-09-05 RX ORDER — PROPOFOL 10 MG/ML
INJECTION, EMULSION INTRAVENOUS PRN
Status: DISCONTINUED | OUTPATIENT
Start: 2024-09-05 | End: 2024-09-05 | Stop reason: SDUPTHER

## 2024-09-05 RX ORDER — DEXMEDETOMIDINE HYDROCHLORIDE 100 UG/ML
INJECTION, SOLUTION INTRAVENOUS PRN
Status: DISCONTINUED | OUTPATIENT
Start: 2024-09-05 | End: 2024-09-05 | Stop reason: SDUPTHER

## 2024-09-05 RX ORDER — SODIUM CHLORIDE 0.9 % (FLUSH) 0.9 %
5-40 SYRINGE (ML) INJECTION EVERY 12 HOURS SCHEDULED
Status: DISCONTINUED | OUTPATIENT
Start: 2024-09-05 | End: 2024-09-05 | Stop reason: HOSPADM

## 2024-09-05 RX ORDER — GLYCOPYRROLATE 0.2 MG/ML
INJECTION INTRAMUSCULAR; INTRAVENOUS PRN
Status: DISCONTINUED | OUTPATIENT
Start: 2024-09-05 | End: 2024-09-05 | Stop reason: SDUPTHER

## 2024-09-05 RX ORDER — BUPIVACAINE HYDROCHLORIDE 2.5 MG/ML
INJECTION, SOLUTION EPIDURAL; INFILTRATION; INTRACAUDAL PRN
Status: DISCONTINUED | OUTPATIENT
Start: 2024-09-05 | End: 2024-09-05 | Stop reason: ALTCHOICE

## 2024-09-05 RX ORDER — ONDANSETRON 2 MG/ML
4 INJECTION INTRAMUSCULAR; INTRAVENOUS
Status: DISCONTINUED | OUTPATIENT
Start: 2024-09-05 | End: 2024-09-05 | Stop reason: HOSPADM

## 2024-09-05 RX ORDER — MELOXICAM 15 MG/1
15 TABLET ORAL DAILY
Qty: 10 TABLET | Refills: 0 | Status: SHIPPED | OUTPATIENT
Start: 2024-09-05

## 2024-09-05 RX ORDER — KETOROLAC TROMETHAMINE 30 MG/ML
30 INJECTION, SOLUTION INTRAMUSCULAR; INTRAVENOUS ONCE
Status: COMPLETED | OUTPATIENT
Start: 2024-09-05 | End: 2024-09-05

## 2024-09-05 RX ORDER — PROCHLORPERAZINE EDISYLATE 5 MG/ML
5 INJECTION INTRAMUSCULAR; INTRAVENOUS
Status: DISCONTINUED | OUTPATIENT
Start: 2024-09-05 | End: 2024-09-05 | Stop reason: HOSPADM

## 2024-09-05 RX ORDER — SODIUM CHLORIDE 0.9 % (FLUSH) 0.9 %
5-40 SYRINGE (ML) INJECTION PRN
Status: DISCONTINUED | OUTPATIENT
Start: 2024-09-05 | End: 2024-09-05 | Stop reason: HOSPADM

## 2024-09-05 RX ORDER — SODIUM CHLORIDE, SODIUM LACTATE, POTASSIUM CHLORIDE, CALCIUM CHLORIDE 600; 310; 30; 20 MG/100ML; MG/100ML; MG/100ML; MG/100ML
INJECTION, SOLUTION INTRAVENOUS CONTINUOUS
Status: DISCONTINUED | OUTPATIENT
Start: 2024-09-05 | End: 2024-09-05 | Stop reason: HOSPADM

## 2024-09-05 RX ORDER — SUCCINYLCHOLINE/SOD CL,ISO/PF 100 MG/5ML
SYRINGE (ML) INTRAVENOUS PRN
Status: DISCONTINUED | OUTPATIENT
Start: 2024-09-05 | End: 2024-09-05 | Stop reason: SDUPTHER

## 2024-09-05 RX ORDER — ROCURONIUM BROMIDE 10 MG/ML
INJECTION, SOLUTION INTRAVENOUS PRN
Status: DISCONTINUED | OUTPATIENT
Start: 2024-09-05 | End: 2024-09-05 | Stop reason: SDUPTHER

## 2024-09-05 RX ORDER — OXYCODONE HYDROCHLORIDE 5 MG/1
5 TABLET ORAL
Status: COMPLETED | OUTPATIENT
Start: 2024-09-05 | End: 2024-09-05

## 2024-09-05 RX ORDER — MIDAZOLAM HYDROCHLORIDE 1 MG/ML
INJECTION INTRAMUSCULAR; INTRAVENOUS PRN
Status: DISCONTINUED | OUTPATIENT
Start: 2024-09-05 | End: 2024-09-05 | Stop reason: SDUPTHER

## 2024-09-05 RX ORDER — NALOXONE HYDROCHLORIDE 0.4 MG/ML
INJECTION, SOLUTION INTRAMUSCULAR; INTRAVENOUS; SUBCUTANEOUS PRN
Status: DISCONTINUED | OUTPATIENT
Start: 2024-09-05 | End: 2024-09-05 | Stop reason: HOSPADM

## 2024-09-05 RX ORDER — SODIUM CHLORIDE 9 MG/ML
INJECTION, SOLUTION INTRAVENOUS PRN
Status: DISCONTINUED | OUTPATIENT
Start: 2024-09-05 | End: 2024-09-05 | Stop reason: HOSPADM

## 2024-09-05 RX ORDER — FENTANYL CITRATE 50 UG/ML
INJECTION, SOLUTION INTRAMUSCULAR; INTRAVENOUS PRN
Status: DISCONTINUED | OUTPATIENT
Start: 2024-09-05 | End: 2024-09-05 | Stop reason: SDUPTHER

## 2024-09-05 RX ORDER — LIDOCAINE HYDROCHLORIDE 20 MG/ML
INJECTION, SOLUTION EPIDURAL; INFILTRATION; INTRACAUDAL; PERINEURAL PRN
Status: DISCONTINUED | OUTPATIENT
Start: 2024-09-05 | End: 2024-09-05 | Stop reason: SDUPTHER

## 2024-09-05 RX ORDER — FENTANYL CITRATE 50 UG/ML
50 INJECTION, SOLUTION INTRAMUSCULAR; INTRAVENOUS EVERY 5 MIN PRN
Status: COMPLETED | OUTPATIENT
Start: 2024-09-05 | End: 2024-09-05

## 2024-09-05 RX ORDER — HYDROMORPHONE HYDROCHLORIDE 1 MG/ML
0.25 INJECTION, SOLUTION INTRAMUSCULAR; INTRAVENOUS; SUBCUTANEOUS EVERY 5 MIN PRN
Status: COMPLETED | OUTPATIENT
Start: 2024-09-05 | End: 2024-09-05

## 2024-09-05 RX ORDER — OXYCODONE AND ACETAMINOPHEN 5; 325 MG/1; MG/1
1 TABLET ORAL EVERY 6 HOURS PRN
Qty: 28 TABLET | Refills: 0 | Status: SHIPPED | OUTPATIENT
Start: 2024-09-05 | End: 2024-09-12

## 2024-09-05 RX ORDER — DEXAMETHASONE SODIUM PHOSPHATE 4 MG/ML
INJECTION, SOLUTION INTRA-ARTICULAR; INTRALESIONAL; INTRAMUSCULAR; INTRAVENOUS; SOFT TISSUE PRN
Status: DISCONTINUED | OUTPATIENT
Start: 2024-09-05 | End: 2024-09-05 | Stop reason: SDUPTHER

## 2024-09-05 RX ORDER — ONDANSETRON 2 MG/ML
INJECTION INTRAMUSCULAR; INTRAVENOUS PRN
Status: DISCONTINUED | OUTPATIENT
Start: 2024-09-05 | End: 2024-09-05 | Stop reason: SDUPTHER

## 2024-09-05 RX ADMIN — PROPOFOL 150 MG: 10 INJECTION, EMULSION INTRAVENOUS at 12:43

## 2024-09-05 RX ADMIN — DEXMEDETOMIDINE HYDROCHLORIDE 6 MCG: 100 INJECTION, SOLUTION INTRAVENOUS at 13:09

## 2024-09-05 RX ADMIN — OXYCODONE HYDROCHLORIDE 5 MG: 5 TABLET ORAL at 15:44

## 2024-09-05 RX ADMIN — Medication 50 MG: at 13:00

## 2024-09-05 RX ADMIN — HYDROMORPHONE HYDROCHLORIDE 0.5 MG: 1 INJECTION, SOLUTION INTRAMUSCULAR; INTRAVENOUS; SUBCUTANEOUS at 13:25

## 2024-09-05 RX ADMIN — WATER 2000 MG: 1 INJECTION INTRAMUSCULAR; INTRAVENOUS; SUBCUTANEOUS at 12:52

## 2024-09-05 RX ADMIN — KETOROLAC TROMETHAMINE 30 MG: 30 INJECTION, SOLUTION INTRAMUSCULAR; INTRAVENOUS at 14:43

## 2024-09-05 RX ADMIN — LIDOCAINE HYDROCHLORIDE 60 MG: 20 INJECTION, SOLUTION EPIDURAL; INFILTRATION; INTRACAUDAL; PERINEURAL at 12:43

## 2024-09-05 RX ADMIN — DEXMEDETOMIDINE HYDROCHLORIDE 6 MCG: 100 INJECTION, SOLUTION INTRAVENOUS at 13:52

## 2024-09-05 RX ADMIN — DEXMEDETOMIDINE HYDROCHLORIDE 6 MCG: 100 INJECTION, SOLUTION INTRAVENOUS at 13:16

## 2024-09-05 RX ADMIN — SUGAMMADEX 150 MG: 100 INJECTION, SOLUTION INTRAVENOUS at 13:48

## 2024-09-05 RX ADMIN — HYDROMORPHONE HYDROCHLORIDE 0.25 MG: 1 INJECTION, SOLUTION INTRAMUSCULAR; INTRAVENOUS; SUBCUTANEOUS at 14:28

## 2024-09-05 RX ADMIN — OXYCODONE 5 MG: 5 TABLET ORAL at 15:02

## 2024-09-05 RX ADMIN — SODIUM CHLORIDE, POTASSIUM CHLORIDE, SODIUM LACTATE AND CALCIUM CHLORIDE: 600; 310; 30; 20 INJECTION, SOLUTION INTRAVENOUS at 12:13

## 2024-09-05 RX ADMIN — GLYCOPYRROLATE 0.1 MG: 0.2 INJECTION INTRAMUSCULAR; INTRAVENOUS at 12:43

## 2024-09-05 RX ADMIN — FENTANYL CITRATE 50 MCG: 50 INJECTION INTRAMUSCULAR; INTRAVENOUS at 14:20

## 2024-09-05 RX ADMIN — SODIUM CHLORIDE, POTASSIUM CHLORIDE, SODIUM LACTATE AND CALCIUM CHLORIDE: 600; 310; 30; 20 INJECTION, SOLUTION INTRAVENOUS at 12:07

## 2024-09-05 RX ADMIN — HYDROMORPHONE HYDROCHLORIDE 0.25 MG: 1 INJECTION, SOLUTION INTRAMUSCULAR; INTRAVENOUS; SUBCUTANEOUS at 14:33

## 2024-09-05 RX ADMIN — ROCURONIUM BROMIDE 40 MG: 10 INJECTION, SOLUTION INTRAVENOUS at 12:59

## 2024-09-05 RX ADMIN — FENTANYL CITRATE 100 MCG: 50 INJECTION, SOLUTION INTRAMUSCULAR; INTRAVENOUS at 12:43

## 2024-09-05 RX ADMIN — Medication 100 MG: at 12:43

## 2024-09-05 RX ADMIN — ONDANSETRON 4 MG: 2 INJECTION INTRAMUSCULAR; INTRAVENOUS at 13:42

## 2024-09-05 RX ADMIN — DEXAMETHASONE SODIUM PHOSPHATE 8 MG: 4 INJECTION INTRA-ARTICULAR; INTRALESIONAL; INTRAMUSCULAR; INTRAVENOUS; SOFT TISSUE at 13:03

## 2024-09-05 RX ADMIN — HYDROMORPHONE HYDROCHLORIDE 0.5 MG: 1 INJECTION, SOLUTION INTRAMUSCULAR; INTRAVENOUS; SUBCUTANEOUS at 13:49

## 2024-09-05 RX ADMIN — MIDAZOLAM 2 MG: 1 INJECTION INTRAMUSCULAR; INTRAVENOUS at 12:36

## 2024-09-05 RX ADMIN — FENTANYL CITRATE 50 MCG: 50 INJECTION INTRAMUSCULAR; INTRAVENOUS at 14:12

## 2024-09-05 ASSESSMENT — PAIN DESCRIPTION - PAIN TYPE
TYPE: SURGICAL PAIN
TYPE: ACUTE PAIN;SURGICAL PAIN
TYPE: ACUTE PAIN;SURGICAL PAIN
TYPE: SURGICAL PAIN
TYPE: ACUTE PAIN
TYPE: ACUTE PAIN;SURGICAL PAIN

## 2024-09-05 ASSESSMENT — PAIN - FUNCTIONAL ASSESSMENT
PAIN_FUNCTIONAL_ASSESSMENT: ACTIVITIES ARE NOT PREVENTED
PAIN_FUNCTIONAL_ASSESSMENT: 0-10
PAIN_FUNCTIONAL_ASSESSMENT: ACTIVITIES ARE NOT PREVENTED

## 2024-09-05 ASSESSMENT — PAIN SCALES - GENERAL
PAINLEVEL_OUTOF10: 6
PAINLEVEL_OUTOF10: 8
PAINLEVEL_OUTOF10: 0
PAINLEVEL_OUTOF10: 0
PAINLEVEL_OUTOF10: 7
PAINLEVEL_OUTOF10: 6
PAINLEVEL_OUTOF10: 10
PAINLEVEL_OUTOF10: 6
PAINLEVEL_OUTOF10: 8
PAINLEVEL_OUTOF10: 0
PAINLEVEL_OUTOF10: 3

## 2024-09-05 ASSESSMENT — PAIN DESCRIPTION - DESCRIPTORS
DESCRIPTORS: ACHING
DESCRIPTORS: ACHING
DESCRIPTORS: CRAMPING
DESCRIPTORS: ACHING
DESCRIPTORS: SORE;BURNING
DESCRIPTORS: BURNING;SORE
DESCRIPTORS: BURNING;SORE
DESCRIPTORS: ACHING
DESCRIPTORS: ACHING
DESCRIPTORS: BURNING;SORE;PRESSURE

## 2024-09-05 ASSESSMENT — PAIN DESCRIPTION - ONSET
ONSET: AWAKENED FROM SLEEP
ONSET: AWAKENED FROM SLEEP
ONSET: GRADUAL
ONSET: AWAKENED FROM SLEEP
ONSET: GRADUAL
ONSET: GRADUAL
ONSET: AWAKENED FROM SLEEP
ONSET: AWAKENED FROM SLEEP

## 2024-09-05 ASSESSMENT — PAIN DESCRIPTION - FREQUENCY
FREQUENCY: INTERMITTENT
FREQUENCY: CONTINUOUS

## 2024-09-05 ASSESSMENT — PAIN DESCRIPTION - LOCATION
LOCATION: VAGINA

## 2024-09-05 ASSESSMENT — PAIN DESCRIPTION - ORIENTATION
ORIENTATION: LOWER
ORIENTATION: LOWER
ORIENTATION: LOWER;INNER
ORIENTATION: LOWER;INNER

## 2024-09-05 ASSESSMENT — LIFESTYLE VARIABLES: SMOKING_STATUS: 1

## 2024-09-05 NOTE — OP NOTE
Operative Note      Patient: Georgette Monroe  YOB: 1990  MRN: [id number]    Date of Procedure: 9/5/2024    Pre-Op Diagnosis Codes:      * Secondary dysmenorrhea [N94.5]     * Mittelschmerz [N94.0]     * Deep endometriosis of left pelvic sidewall [N80.342]     * Stomach ache [R10.9]    Post-Op Diagnosis: Same       Procedure(s):  ROBOTIC TOTAL LAPAROSCOPIC HYSTERECTOMY, BILATERAL SALPINGECTOMY [facility]    Surgeon(s):  Donald Ford MD    Assistant:   Surgical Assistant: Kristel Webb    Anesthesia: General    Estimated Blood Loss (mL): less than 100     Complications: None    Specimens:   ID Type Source Tests Collected by Time Destination   A : UTERUS, BILATERAL FALLOPIAN TUBES Tissue Uterus SURGICAL PATHOLOGY Donald Ford MD 9/5/2024 1335        Implants:  * No implants in log *      Drains:   [REMOVED] Urinary Catheter 09/05/24 Mejia (Removed)       Findings: 10 week size uterus, grossly normal ovaries and tubes, patent ureteral orifices, grossly normal bladder mucosa          Detailed Description of Procedure:     The patient was taken to the operating room after informed consent had been obtained. She was placed on the OR table and general anesthesia was initiated. She was then placed in the dorsal lithotomy position, prepped and draped in a sterile fashion. Mejia catheter was inserted. Serial compression stocking were in place. Time out was completed. Attention was turned to the vagina where a weighted-speculum was placed. A Dhaliwal retractor was used to expose the cervix and the cervix was grasped at the 12 o’clock position with a single tooth tenaculum. The Safia uterine manipulator was affixed to the cervix and the other instruments were removed from the vagina. Attention was turned to the abdomen, where Marcaine was injected 3 cm above the umbilicus. An 8 mm insicion was made and an 8 mm nonbladed trocar was inserted. Once intra-peritoneal placement was verified, high flow  insufflation was initiated. Three other ports were made a hands breath from the midline on the left and right in a similar manner with the third an AirSeal in the left upper quadrant. The patient was placed in steep trendelenburg. The Xi robot was docked. The ureters were identified bilaterally. The right mesosalpinx was cauterized and transected back to the cornua. The right ovarian suspensory ligament was cauterized and transected. The right round ligament was cauterized and transected. The right portion of the bladder flap was made. The right uterine artery was skeletonized, cauterized and transected. The same procedure was used on the left side. The anterior colpotomy was made followed by the posterior colpotomy. The uterosacral ligaments were transected.The uterus was delivered through the vagina. The vaginal cuff was closed with 3-0 V loc suture running, starting from the right and closing the cuff to the left incorporating the posterior leaf of peritoneum. Two throws of suture were make back to the right and suture was cut.The abdomen was cleared of all clot and debris. Hemostasis was verified. The instruments were removed from the abdomen. The skin incisions were closed with 4-0 monicoryl interrupted subcutaneously and Dermabond for the skin edges. The hopper catheter was removed. The cystoscope was inserted with fluid running. The bladder mucosa was intact. The ureteral orifices were patent. The patient was returned to recovery in stable condition.     Electronically signed by DARRELL BADILLO MD on 9/5/2024 at 1:56 PM

## 2024-09-05 NOTE — PERIOP NOTE
Phone call per Dr. Ford - states vaginal cuff was sutured closed - most likely cause of pts discomfort - may repeat Oxycodone 5 mg po

## 2024-09-05 NOTE — PERIOP NOTE
Medicated with Oxycodone 5 mg po x 1 for c/o pain - Discharge instructions reviewed with patient and family.  Opportunity for questions and answers given.  No further questions at this time.

## 2024-09-05 NOTE — PERIOP NOTE
TRANSFER - IN REPORT:    Verbal report received from ABILIO Childers RN on Georgette Monroe  being received from PACU for routine post-op      Report consisted of patient's Situation, Background, Assessment and   Recommendations(SBAR).     Information from the following report(s) Nurse Handoff Report, Adult Overview, Surgery Report, Intake/Output, and MAR was reviewed with the receiving nurse.    Opportunity for questions and clarification was provided.      Assessment completed upon patient's arrival to unit and care assumed.

## 2024-09-05 NOTE — DISCHARGE INSTRUCTIONS
RESUME PRE HOSPITAL DIET  DRINK PLENTY OF FLUIDS  AMBULATE IN HOUSE MULTIPLE TIMES DAILY  TAKE PRESCRIPTIONS AS DIRECTED  REST ELEVATED  USE ICE PACK FOR SWELLING  AVOID HEAVY LIFTING, PUSHING, PULLING  AVOID STRAINING  AVOID CONSTIPATION  SANITARY PADS ONLY - NO TAMPONS, DOUCHING OR SEX UNTIL ADVISED  SHOWER TOMORROW - NO OINTMENT, LOTIONS OR PEROXIDE TO INCISIONS - (ONLY SOAP AND WATER)  FOLLOW UP WITH DR. BADILLO AS SCHEDULED  CONTACT DR. BADILLO'S OFFICE WITH ANY POST OP QUESTIONS OR CONCERNS  - [phone]    Laparoscopic Hysterectomy: What to Expect at Home  Your Recovery     A laparoscopic hysterectomy is surgery to take out the uterus. Your doctor put a lighted tube and surgical tools through small cuts in your belly to remove the uterus.  You can expect to feel better and stronger each day. But you might need pain medicine for a week or two. After a laparoscopy, you may have shoulder pain. This is caused by the air your doctor put in your belly to help see your organs better. The pain may last for a day or two. You may get tired easily or have less energy than usual. The tiredness may last for several weeks after surgery. And you also may have light vaginal bleeding for a few weeks.  It's important to avoid lifting while you are recovering so that you can heal. It may take about 4 to 6 weeks to fully recover. The recovery time may be shorter for some people.  This care sheet gives you a general idea about how long it will take for you to recover. But each person recovers at a different pace. Follow the steps below to get better as quickly as possible.  How can you care for yourself at home?  Activity    Rest when you feel tired. Getting enough sleep will help you recover.     Try to walk each day. Start by walking a little more than you did the day before. Bit by bit, increase the amount you walk. Walking boosts blood flow and helps prevent pneumonia and constipation.     Avoid lifting anything that

## 2024-09-05 NOTE — ANESTHESIA PRE PROCEDURE
Department of Anesthesiology  Preprocedure Note       Name:  Georgette Monroe   Age:  33 y.o.  :  1990                                          MRN:  [id number]         Date:  2024      Surgeon: Surgeon(s):  Donald Ford MD    Procedure: Procedure(s):  ROBOTIC TOTAL LAPAROSCOPIC HYSTERECTOMY, BILATERAL SALPINGECTOMY ERAS PROTOCAL    Medications prior to admission:   Prior to Admission medications    Medication Sig Start Date End Date Taking? Authorizing Provider   ibuprofen (ADVIL;MOTRIN) 800 MG tablet Take 1 tablet by mouth every 8 hours as needed 20   Automatic Reconciliation, Ar       Current medications:    Current Facility-Administered Medications   Medication Dose Route Frequency Provider Last Rate Last Admin    lactated ringers IV soln infusion   IntraVENous Continuous Jame Caputo DO 50 mL/hr at 24 1213 New Bag at 24 1213    lactated ringers IV soln infusion   IntraVENous Continuous Donald Ford  mL/hr at 24 1207 New Bag at 24 1207    ceFAZolin (ANCEF) 2,000 mg in sterile water 20 mL IV syringe  2,000 mg IntraVENous On Call to OR Donald Ford MD           Allergies:  No Known Allergies    Problem List:    Patient Active Problem List   Diagnosis Code    Asthma J45.909    Endometriosis N80.9       Past Medical History:        Diagnosis Date    Abnormal Papanicolaou smear of cervix     Anemia     Asthma     childhood- denies meds    Endometriosis        Past Surgical History:        Procedure Laterality Date    PELVIC LAPAROSCOPY      endometriosis    [place] UNLISTED PROCEDURE ABDOMEN PERITONEUM & OMENTUM      exploratory lap    WISDOM TOOTH EXTRACTION         Social History:    Social History     Tobacco Use    Smoking status: Every Day     Current packs/day: 0.50     Types: Cigarettes    Smokeless tobacco: Current    Tobacco comments:     Pt aware not to smoke within 24/hrs of procedure   Substance Use Topics    Alcohol use: Yes

## 2024-09-05 NOTE — PERIOP NOTE
Reviewed PTA medication list with patient/caregiver and patient/caregiver denies any additional medications.     Patient admits to having a responsible adult care for them at home for at least 24 hours after surgery.    Patient encouraged to use gown warming system and informed that using said warming gown to regulate body temperature prior to a procedure has been shown to help reduce the risks of blood clots and infection.    Patient's pharmacy of choice verified and documented in PTA medication section.    Dual skin assessment & fall risk band verification completed with Claudine IBRAHIM RN.

## 2024-09-05 NOTE — PERIOP NOTE
TRANSFER - OUT REPORT:    Verbal report given to Sari TABARES  on Georgette Monroe  being transferred to Phase II  for routine progression of patient care       Report consisted of patient's Situation, Background, Assessment and   Recommendations(SBAR).     Information from the following report(s) Adult Overview, Surgery Report, Intake/Output, and MAR was reviewed with the receiving nurse.           Lines:   Peripheral IV 09/05/24 Posterior;Right Hand (Active)   Site Assessment Clean, dry & intact 09/05/24 1527   Line Status Infusing 09/05/24 1527   Line Care Connections checked and tightened 09/05/24 1527   Phlebitis Assessment No symptoms 09/05/24 1527   Infiltration Assessment 0 09/05/24 1527   Alcohol Cap Used No 09/05/24 1211   Dressing Status Clean, dry & intact 09/05/24 1527   Dressing Type Transparent 09/05/24 1527       Peripheral IV 09/05/24 Left;Posterior Hand (Active)   Site Assessment Clean, dry & intact 09/05/24 1527   Line Status Infusing 09/05/24 1527   Line Care Connections checked and tightened 09/05/24 1527   Phlebitis Assessment No symptoms 09/05/24 1527   Infiltration Assessment 0 09/05/24 1527   Alcohol Cap Used No 09/05/24 1212   Dressing Status Clean, dry & intact 09/05/24 1527   Dressing Type Transparent 09/05/24 1527        Opportunity for questions and clarification was provided.      Patient transported with:  Registered Nurse

## 2024-09-05 NOTE — PERIOP NOTE
TRANSFER - IN REPORT:    Verbal report received from Yvette Marte RN  on Georgette Monroe  being received from OR for routine progression of patient care      Report consisted of patient's Situation, Background, Assessment and   Recommendations(SBAR).     Information from the following report(s) Adult Overview, Surgery Report, Intake/Output, and MAR was reviewed with the receiving nurse.    Opportunity for questions and clarification was provided.      Assessment completed upon patient's arrival to unit and care assumed.

## 2024-09-05 NOTE — PERIOP NOTE
Arrived to Phase 2 - alert and oriented - tearful - states \" stabbing sensation in my vagina\" - assisted to BR - unable to void - returned to recliner - pt repositioned to left side

## 2024-09-05 NOTE — ANESTHESIA POSTPROCEDURE EVALUATION
Department of Anesthesiology  Postprocedure Note    Patient: Georgette Monroe  MRN: [id number]  YOB: 1990  Date of evaluation: 9/5/2024    Procedure Summary       Date: 09/05/24 Room / Location: [facility] MAIN 03 / [facility] MAIN OR    Anesthesia Start: 1236 Anesthesia Stop: 1405    Procedure: ROBOTIC TOTAL LAPAROSCOPIC HYSTERECTOMY, BILATERAL SALPINGECTOMY ERAS PROTOCAL (Abdomen) Diagnosis:       Secondary dysmenorrhea      Mittelschmerz      Deep endometriosis of left pelvic sidewall      Stomach ache      (Secondary dysmenorrhea [N94.5])      (Mittelschmerz [N94.0])      (Deep endometriosis of left pelvic sidewall [N80.342])      (Stomach ache [R10.9])    Surgeons: Donald Ford MD Responsible Provider: Jame Caputo DO    Anesthesia Type: General ASA Status: 2            Anesthesia Type: General    Denisse Phase I: Denisse Score: 9    Denisse Phase II:      Anesthesia Post Evaluation    Patient location during evaluation: PACU  Patient participation: complete - patient participated  Level of consciousness: awake and alert  Pain score: 5  Airway patency: patent  Nausea & Vomiting: no nausea and no vomiting  Cardiovascular status: hemodynamically stable  Respiratory status: acceptable and spontaneous ventilation  Hydration status: euvolemic  Pain management: satisfactory to patient        No notable events documented.

## (undated) DEVICE — SOLUTION IV LACTATED RINGERS INJECTION USP

## (undated) DEVICE — SYSTEM ES CUP DIA3CM PNEUMO OCCL BLLN DISP FOR CLIN POS

## (undated) DEVICE — TRI-LUMEN FILTERED TUBE SET WITH ACTIVATED CHARCOAL FILTER: Brand: AIRSEAL

## (undated) DEVICE — ROBOTIC PACK: Brand: MEDLINE INDUSTRIES, INC.

## (undated) DEVICE — SKIN PREP TRAY 4 COMPARTM TRAY: Brand: MEDLINE INDUSTRIES, INC.

## (undated) DEVICE — DRAPE C ARM UNIV W41XL74IN CLR PLAS XR VELC CLSR POLY STRP

## (undated) DEVICE — PAD BD CONVOLUTED FOAM

## (undated) DEVICE — SEAL

## (undated) DEVICE — GLOVE SURG SZ 9 THK91MIL LTX FREE SYN POLYISOPRENE ANTI

## (undated) DEVICE — ARM DRAPE

## (undated) DEVICE — ELECTRODE PT RET AD L9FT HI MOIST COND ADH HYDRGEL CORDED

## (undated) DEVICE — 1LYRTR 16FR10ML100%SIL UMS SNP: Brand: MEDLINE INDUSTRIES, INC.

## (undated) DEVICE — SOLUTION IRRIG 500ML 0.9% SOD CHLO USP POUR PLAS BTL

## (undated) DEVICE — TIP IU L6CM DIA6.7MM WHT SIL FLX DISP RUMI II

## (undated) DEVICE — SUTURE DEV SZ 2-0 WND CLSR ABSRB GS-22 VLOC COVIDIEN VLOCM2145

## (undated) DEVICE — SYRINGE MED 10ML LUERLOCK TIP W/O SFTY DISP

## (undated) DEVICE — Y-TYPE TUR/BLADDER IRRIGATION SET, REGULATING CLAMP

## (undated) DEVICE — GLOVE ORANGE PI 8 1/2   MSG9085

## (undated) DEVICE — LIQUIBAND RAPID ADHESIVE 36/CS 0.8ML: Brand: MEDLINE

## (undated) DEVICE — AIRSEAL 5 MM ACCESS PORT AND LOW PROFILE OBTURATOR WITH BLADELESS OPTICAL TIP, 120 MM LENGTH: Brand: AIRSEAL

## (undated) DEVICE — PAD PT POS 36 IN SURGYPAD DISP

## (undated) DEVICE — SOLUTION IRRIGATION H2O 0797305] ICU MEDICAL INC]

## (undated) DEVICE — COLUMN DRAPE

## (undated) DEVICE — GARMENT,MEDLINE,DVT,INT,CALF,MED, GEN2: Brand: MEDLINE

## (undated) DEVICE — BLADELESS OBTURATOR: Brand: WECK VISTA

## (undated) DEVICE — TIP COVER ACCESSORY

## (undated) DEVICE — TIP IU L3.75CM DIA5.1MM YEL SFT FLX DST END DISP RUMI II

## (undated) DEVICE — SET TBNG DISP TIP FOR AHTO

## (undated) DEVICE — APPLICATOR MEDICATED 26 CC SOLUTION HI LT ORNG CHLORAPREP